# Patient Record
Sex: FEMALE | Race: WHITE | NOT HISPANIC OR LATINO | URBAN - METROPOLITAN AREA
[De-identification: names, ages, dates, MRNs, and addresses within clinical notes are randomized per-mention and may not be internally consistent; named-entity substitution may affect disease eponyms.]

---

## 2018-05-31 ENCOUNTER — IMPORTED ENCOUNTER (OUTPATIENT)
Dept: URBAN - METROPOLITAN AREA CLINIC 9 | Facility: CLINIC | Age: 83
End: 2018-05-31

## 2018-06-07 ENCOUNTER — IMPORTED ENCOUNTER (OUTPATIENT)
Dept: URBAN - METROPOLITAN AREA CLINIC 9 | Facility: CLINIC | Age: 83
End: 2018-06-07

## 2018-06-28 ENCOUNTER — IMPORTED ENCOUNTER (OUTPATIENT)
Dept: URBAN - METROPOLITAN AREA CLINIC 9 | Facility: CLINIC | Age: 83
End: 2018-06-28

## 2018-07-19 ENCOUNTER — IMPORTED ENCOUNTER (OUTPATIENT)
Dept: URBAN - METROPOLITAN AREA CLINIC 9 | Facility: CLINIC | Age: 83
End: 2018-07-19

## 2018-08-23 ENCOUNTER — IMPORTED ENCOUNTER (OUTPATIENT)
Dept: URBAN - METROPOLITAN AREA CLINIC 9 | Facility: CLINIC | Age: 83
End: 2018-08-23

## 2018-10-11 ENCOUNTER — IMPORTED ENCOUNTER (OUTPATIENT)
Dept: URBAN - METROPOLITAN AREA CLINIC 9 | Facility: CLINIC | Age: 83
End: 2018-10-11

## 2018-10-18 ENCOUNTER — IMPORTED ENCOUNTER (OUTPATIENT)
Dept: URBAN - METROPOLITAN AREA CLINIC 9 | Facility: CLINIC | Age: 83
End: 2018-10-18

## 2018-12-20 ENCOUNTER — IMPORTED ENCOUNTER (OUTPATIENT)
Dept: URBAN - METROPOLITAN AREA CLINIC 9 | Facility: CLINIC | Age: 83
End: 2018-12-20

## 2019-02-15 ENCOUNTER — IMPORTED ENCOUNTER (OUTPATIENT)
Dept: URBAN - METROPOLITAN AREA CLINIC 9 | Facility: CLINIC | Age: 84
End: 2019-02-15

## 2019-03-08 ENCOUNTER — IMPORTED ENCOUNTER (OUTPATIENT)
Dept: URBAN - METROPOLITAN AREA CLINIC 9 | Facility: CLINIC | Age: 84
End: 2019-03-08

## 2019-03-19 ENCOUNTER — IMPORTED ENCOUNTER (OUTPATIENT)
Dept: URBAN - METROPOLITAN AREA CLINIC 9 | Facility: CLINIC | Age: 84
End: 2019-03-19

## 2019-04-11 ENCOUNTER — IMPORTED ENCOUNTER (OUTPATIENT)
Dept: URBAN - METROPOLITAN AREA CLINIC 9 | Facility: CLINIC | Age: 84
End: 2019-04-11

## 2019-04-25 ENCOUNTER — IMPORTED ENCOUNTER (OUTPATIENT)
Dept: URBAN - METROPOLITAN AREA CLINIC 9 | Facility: CLINIC | Age: 84
End: 2019-04-25

## 2019-05-02 ENCOUNTER — IMPORTED ENCOUNTER (OUTPATIENT)
Dept: URBAN - METROPOLITAN AREA CLINIC 9 | Facility: CLINIC | Age: 84
End: 2019-05-02

## 2019-05-09 ENCOUNTER — IMPORTED ENCOUNTER (OUTPATIENT)
Dept: URBAN - METROPOLITAN AREA CLINIC 9 | Facility: CLINIC | Age: 84
End: 2019-05-09

## 2019-05-14 ENCOUNTER — IMPORTED ENCOUNTER (OUTPATIENT)
Dept: URBAN - METROPOLITAN AREA CLINIC 9 | Facility: CLINIC | Age: 84
End: 2019-05-14

## 2019-05-16 ENCOUNTER — IMPORTED ENCOUNTER (OUTPATIENT)
Dept: URBAN - METROPOLITAN AREA CLINIC 9 | Facility: CLINIC | Age: 84
End: 2019-05-16

## 2019-05-23 ENCOUNTER — IMPORTED ENCOUNTER (OUTPATIENT)
Dept: URBAN - METROPOLITAN AREA CLINIC 9 | Facility: CLINIC | Age: 84
End: 2019-05-23

## 2019-06-06 ENCOUNTER — IMPORTED ENCOUNTER (OUTPATIENT)
Dept: URBAN - METROPOLITAN AREA CLINIC 9 | Facility: CLINIC | Age: 84
End: 2019-06-06

## 2019-06-20 ENCOUNTER — IMPORTED ENCOUNTER (OUTPATIENT)
Dept: URBAN - METROPOLITAN AREA CLINIC 9 | Facility: CLINIC | Age: 84
End: 2019-06-20

## 2019-06-27 ENCOUNTER — IMPORTED ENCOUNTER (OUTPATIENT)
Dept: URBAN - METROPOLITAN AREA CLINIC 9 | Facility: CLINIC | Age: 84
End: 2019-06-27

## 2019-07-11 ENCOUNTER — IMPORTED ENCOUNTER (OUTPATIENT)
Dept: URBAN - METROPOLITAN AREA CLINIC 9 | Facility: CLINIC | Age: 84
End: 2019-07-11

## 2019-08-08 ENCOUNTER — IMPORTED ENCOUNTER (OUTPATIENT)
Dept: URBAN - METROPOLITAN AREA CLINIC 9 | Facility: CLINIC | Age: 84
End: 2019-08-08

## 2019-08-13 ENCOUNTER — IMPORTED ENCOUNTER (OUTPATIENT)
Dept: URBAN - METROPOLITAN AREA CLINIC 9 | Facility: CLINIC | Age: 84
End: 2019-08-13

## 2019-08-22 ENCOUNTER — IMPORTED ENCOUNTER (OUTPATIENT)
Dept: URBAN - METROPOLITAN AREA CLINIC 9 | Facility: CLINIC | Age: 84
End: 2019-08-22

## 2019-08-29 ENCOUNTER — IMPORTED ENCOUNTER (OUTPATIENT)
Dept: URBAN - METROPOLITAN AREA CLINIC 9 | Facility: CLINIC | Age: 84
End: 2019-08-29

## 2019-09-12 ENCOUNTER — IMPORTED ENCOUNTER (OUTPATIENT)
Dept: URBAN - METROPOLITAN AREA CLINIC 9 | Facility: CLINIC | Age: 84
End: 2019-09-12

## 2019-09-19 ENCOUNTER — IMPORTED ENCOUNTER (OUTPATIENT)
Dept: URBAN - METROPOLITAN AREA CLINIC 9 | Facility: CLINIC | Age: 84
End: 2019-09-19

## 2019-10-03 ENCOUNTER — IMPORTED ENCOUNTER (OUTPATIENT)
Dept: URBAN - METROPOLITAN AREA CLINIC 9 | Facility: CLINIC | Age: 84
End: 2019-10-03

## 2019-10-17 ENCOUNTER — IMPORTED ENCOUNTER (OUTPATIENT)
Dept: URBAN - METROPOLITAN AREA CLINIC 9 | Facility: CLINIC | Age: 84
End: 2019-10-17

## 2019-11-07 ENCOUNTER — IMPORTED ENCOUNTER (OUTPATIENT)
Dept: URBAN - METROPOLITAN AREA CLINIC 9 | Facility: CLINIC | Age: 84
End: 2019-11-07

## 2019-11-21 ENCOUNTER — IMPORTED ENCOUNTER (OUTPATIENT)
Dept: URBAN - METROPOLITAN AREA CLINIC 9 | Facility: CLINIC | Age: 84
End: 2019-11-21

## 2020-01-02 ENCOUNTER — IMPORTED ENCOUNTER (OUTPATIENT)
Dept: URBAN - METROPOLITAN AREA CLINIC 9 | Facility: CLINIC | Age: 85
End: 2020-01-02

## 2020-01-16 ENCOUNTER — IMPORTED ENCOUNTER (OUTPATIENT)
Dept: URBAN - METROPOLITAN AREA CLINIC 9 | Facility: CLINIC | Age: 85
End: 2020-01-16

## 2020-02-13 ENCOUNTER — IMPORTED ENCOUNTER (OUTPATIENT)
Dept: URBAN - METROPOLITAN AREA CLINIC 9 | Facility: CLINIC | Age: 85
End: 2020-02-13

## 2020-03-10 ENCOUNTER — IMPORTED ENCOUNTER (OUTPATIENT)
Dept: URBAN - METROPOLITAN AREA CLINIC 9 | Facility: CLINIC | Age: 85
End: 2020-03-10

## 2020-05-07 ENCOUNTER — IMPORTED ENCOUNTER (OUTPATIENT)
Dept: URBAN - METROPOLITAN AREA CLINIC 9 | Facility: CLINIC | Age: 85
End: 2020-05-07

## 2020-06-18 ENCOUNTER — IMPORTED ENCOUNTER (OUTPATIENT)
Dept: URBAN - METROPOLITAN AREA CLINIC 9 | Facility: CLINIC | Age: 85
End: 2020-06-18

## 2020-09-17 ENCOUNTER — IMPORTED ENCOUNTER (OUTPATIENT)
Dept: URBAN - METROPOLITAN AREA CLINIC 9 | Facility: CLINIC | Age: 85
End: 2020-09-17

## 2020-11-05 ENCOUNTER — IMPORTED ENCOUNTER (OUTPATIENT)
Dept: URBAN - METROPOLITAN AREA CLINIC 9 | Facility: CLINIC | Age: 85
End: 2020-11-05

## 2020-11-19 ENCOUNTER — IMPORTED ENCOUNTER (OUTPATIENT)
Dept: URBAN - METROPOLITAN AREA CLINIC 9 | Facility: CLINIC | Age: 85
End: 2020-11-19

## 2021-02-25 ENCOUNTER — IMPORTED ENCOUNTER (OUTPATIENT)
Dept: URBAN - METROPOLITAN AREA CLINIC 9 | Facility: CLINIC | Age: 86
End: 2021-02-25

## 2021-03-18 ENCOUNTER — IMPORTED ENCOUNTER (OUTPATIENT)
Dept: URBAN - METROPOLITAN AREA CLINIC 9 | Facility: CLINIC | Age: 86
End: 2021-03-18

## 2021-04-08 ENCOUNTER — IMPORTED ENCOUNTER (OUTPATIENT)
Dept: URBAN - METROPOLITAN AREA CLINIC 9 | Facility: CLINIC | Age: 86
End: 2021-04-08

## 2021-05-06 ENCOUNTER — IMPORTED ENCOUNTER (OUTPATIENT)
Dept: URBAN - METROPOLITAN AREA CLINIC 9 | Facility: CLINIC | Age: 86
End: 2021-05-06

## 2021-05-18 ENCOUNTER — IMPORTED ENCOUNTER (OUTPATIENT)
Dept: URBAN - METROPOLITAN AREA CLINIC 9 | Facility: CLINIC | Age: 86
End: 2021-05-18

## 2021-05-27 ENCOUNTER — IMPORTED ENCOUNTER (OUTPATIENT)
Dept: URBAN - METROPOLITAN AREA CLINIC 9 | Facility: CLINIC | Age: 86
End: 2021-05-27

## 2021-07-15 ENCOUNTER — IMPORTED ENCOUNTER (OUTPATIENT)
Dept: URBAN - METROPOLITAN AREA CLINIC 9 | Facility: CLINIC | Age: 86
End: 2021-07-15

## 2021-07-15 PROBLEM — Z96.1: Noted: 2021-07-15

## 2021-10-16 ASSESSMENT — TONOMETRY
OS_IOP_MMHG: 15
OS_IOP_MMHG: 14
OD_IOP_MMHG: 18
OS_IOP_MMHG: 12
OD_IOP_MMHG: 12
OD_IOP_MMHG: 10
OD_IOP_MMHG: 15
OD_IOP_MMHG: 16
OS_IOP_MMHG: 15
OS_IOP_MMHG: 17
OS_IOP_MMHG: 13
OD_IOP_MMHG: 14
OS_IOP_MMHG: 15
OD_IOP_MMHG: 12
OD_IOP_MMHG: 17
OS_IOP_MMHG: 16
OS_IOP_MMHG: 16
OD_IOP_MMHG: 10
OD_IOP_MMHG: 11
OD_IOP_MMHG: 12
OS_IOP_MMHG: 18
OD_IOP_MMHG: 11
OD_IOP_MMHG: 8
OS_IOP_MMHG: 18
OS_IOP_MMHG: 18
OD_IOP_MMHG: 18
OD_IOP_MMHG: 14
OS_IOP_MMHG: 19
OD_IOP_MMHG: 14
OS_IOP_MMHG: 18
OD_IOP_MMHG: 15
OS_IOP_MMHG: 14
OS_IOP_MMHG: 19
OD_IOP_MMHG: 10
OD_IOP_MMHG: 12
OD_IOP_MMHG: 10
OD_IOP_MMHG: 14
OS_IOP_MMHG: 18
OS_IOP_MMHG: 16
OD_IOP_MMHG: 11
OD_IOP_MMHG: 12
OD_IOP_MMHG: 11
OD_IOP_MMHG: 15
OD_IOP_MMHG: 12
OD_IOP_MMHG: 13
OD_IOP_MMHG: 10
OS_IOP_MMHG: 18
OD_IOP_MMHG: 12
OD_IOP_MMHG: 12
OS_IOP_MMHG: 15
OS_IOP_MMHG: 15
OS_IOP_MMHG: 18
OD_IOP_MMHG: 16
OD_IOP_MMHG: 11
OS_IOP_MMHG: 17
OD_IOP_MMHG: 8
OD_IOP_MMHG: 16
OD_IOP_MMHG: 12
OD_IOP_MMHG: 10
OD_IOP_MMHG: 12
OS_IOP_MMHG: 19
OS_IOP_MMHG: 15
OS_IOP_MMHG: 18
OD_IOP_MMHG: 11
OS_IOP_MMHG: 8
OS_IOP_MMHG: 10
OS_IOP_MMHG: 15
OS_IOP_MMHG: 10
OS_IOP_MMHG: 15
OS_IOP_MMHG: 14
OS_IOP_MMHG: 15
OS_IOP_MMHG: 17
OS_IOP_MMHG: 16
OD_IOP_MMHG: 13
OD_IOP_MMHG: 17
OS_IOP_MMHG: 16
OS_IOP_MMHG: 19
OS_IOP_MMHG: 15
OS_IOP_MMHG: 15
OS_IOP_MMHG: 18
OS_IOP_MMHG: 14
OD_IOP_MMHG: 12

## 2021-10-16 ASSESSMENT — VISUAL ACUITY
OD_CC: 20/400 SN
OD_SC: 20/80 SN
OD_CC: 20/400 SN
OS_CC: 20/30 -2 SN
OS_CC: 20/40 - SN
OD_CC: 20/200 SN
OS_SC: 20/60 + SN
OS_PH: 20/40 -2 SN
OS_CC: 20/30 -2 SN
OS_CC: 20/30 -2 SN
OD_CC: 20/40 -2 SN
OS_PH: 20/40 - SN
OD_CC: 20/200 + SN
OD_PH: 20/60 SN
OD_CC: 20/200 SN
OD_PH: 20/50 SN
OD_SC: 20/50 + SN
OS_CC: 20/30 -2 SN
OD_CC: 20/40 SN
OD_CC: 20/40 SN
OS_CC: 20/30 + SN
OS_CC: 20/40 -2 SN
OD_CC: 20/80 -2 SN
OD_CC: 20/200 - SN
OD_CC: 20/200 SN
OD_CC: 20/200 SN
OS_CC: 20/40 SN
OD_PH: 20/70 - SN
OS_CC: 20/40 SN
OS_CC: 20/30 - SN
OD_CC: 20/70 + SN
OD_CC: 20/70 SN
OD_PH: 20/40 SN
OD_CC: 20/30 SN
OS_CC: 20/50 -2 SN
OS_CC: 20/25 -2 SN
OS_CC: 20/30 -2 SN
OS_SC: 20/80 - SN
OD_CC: 20/50 -2 SN
OS_CC: 20/40 + SN
OS_CC: 20/40 SN
OS_CC: 20/40 -2 SN
OD_CC: 20/200 SN
OD_PH: 20/100 - SN
OD_SC: 20/200 SN
OD_SC: 20/100 - SN
OD_CC: 20/50 -2 SN
OD_PH: 20/40 + SN
OS_SC: 20/70 + SN
OD_PH: 20/50 + SN
OS_CC: 20/40 -2 SN
OS_CC: 20/30 SN
OD_CC: 20/200 SN
OS_CC: 20/70 SN
OS_CC: 20/50 -2 SN
OD_CC: 20/200 SN
OS_CC: 20/30 + SN
OD_CC: 20/100 + SN
OS_CC: 20/40 - SN
OD_CC: 20/40 + SN
OD_PH: 20/70 -2 SN
OS_CC: 20/40 -2 SN
OS_CC: 20/50 - SN
OS_CC: 20/30 -2 SN
OS_CC: 20/25 -2 SN
OS_CC: 20/60 + SN
OD_PH: 20/60 + SN
OD_CC: 20/70 SN
OS_SC: 20/80 -2 SN
OS_CC: 20/30 -2 SN
OD_CC: 20/400 SN
OD_CC: 20/400 SN
OS_CC: 20/30 + SN
OD_CC: 20/50 + SN
OD_CC: 20/60 + SN
OD_CC: 20/200 SN
OS_CC: 20/60 - SN
OS_CC: 20/50 + SN
OS_CC: 20/30 + SN
OD_SC: 20/70 + SN
OD_PH: 20/100 + SN
OD_PH: 20/40 + SN
OD_SC: 20/50 + SN
OD_SC: 20/70 - SN
OD_CC: 20/200 - SN
OD_SC: 20/70 + SN
OD_PH: 20/40 SN
OS_PH: 20/50 -2 SN
OD_CC: 20/80 SN
OD_CC: 20/400 SN
OS_SC: CF 2FT SN
OD_CC: 20/40 - SN
OS_CC: 20/30 -2 SN
OD_CC: 20/40 - SN
OS_SC: 20/100 + SN
OD_CC: 20/400 SN
OD_CC: 20/200 SN
OD_SC: 20/200 SN
OS_CC: 20/40 -2 SN
OS_CC: 20/30 -2 SN
OS_CC: 20/60 SN
OD_CC: 20/80 + SN
OD_SC: 20/200 SN
OD_CC: CF 10' LV
OS_CC: 20/40 +2 SN
OD_CC: 20/40 -2 SN
OS_CC: 20/40 SN
OD_SC: 20/50 - SN
OS_CC: 20/30 -2 SN
OD_CC: 20/400 SN
OD_CC: 20/40 - SN
OD_CC: 20/400 SN
OD_SC: 20/200 SN
OD_PH: 20/70 - SN
OS_CC: 20/30 + SN
OD_PH: 20/40 + SN
OS_CC: 20/30 -2 SN
OS_CC: 20/30 -3 SN
OD_CC: 20/30 SN
OD_SC: 20/60 -2 SN
OD_CC: 20/200 SN

## 2021-10-16 ASSESSMENT — KERATOMETRY
OS_K1POWER_DIOPTERS: 44.75
OS_AXISANGLE_DEGREES: 65
OS_K1POWER_DIOPTERS: 44.75
OS_AXISANGLE_DEGREES: 62
OS_K1POWER_DIOPTERS: 45
OD_AXISANGLE_DEGREES: 101
OS_K2POWER_DIOPTERS: 46.5
OS_AXISANGLE2_DEGREES: 152
OS_AXISANGLE_DEGREES: 61
OS_K2POWER_DIOPTERS: 46.25
OD_AXISANGLE2_DEGREES: 11
OS_K1POWER_DIOPTERS: 44.75
OS_AXISANGLE2_DEGREES: 155
OS_AXISANGLE2_DEGREES: 152
OS_AXISANGLE_DEGREES: 49
OS_K2POWER_DIOPTERS: 46.5
OD_K2POWER_DIOPTERS: 46.25
OD_AXISANGLE2_DEGREES: 18
OS_K2POWER_DIOPTERS: 46.5
OD_K2POWER_DIOPTERS: 46.5
OS_AXISANGLE2_DEGREES: 139
OS_K1POWER_DIOPTERS: 44.75
OS_AXISANGLE2_DEGREES: 151
OD_K1POWER_DIOPTERS: 45.25
OS_AXISANGLE_DEGREES: 62
OD_K1POWER_DIOPTERS: 45
OS_K2POWER_DIOPTERS: 46.5
OD_AXISANGLE_DEGREES: 108

## 2021-11-04 ENCOUNTER — MOHS SURGERY-NO REPAIR (OUTPATIENT)
Dept: URBAN - METROPOLITAN AREA CLINIC 12 | Facility: CLINIC | Age: 86
Setting detail: DERMATOLOGY
End: 2021-11-04

## 2021-11-04 DIAGNOSIS — L82.1 OTHER SEBORRHEIC KERATOSIS: ICD-10-CM

## 2021-11-04 PROCEDURE — 17311 MOHS 1 STAGE H/N/HF/G: CPT

## 2021-11-16 ENCOUNTER — POST-OP (OUTPATIENT)
Dept: URBAN - METROPOLITAN AREA CLINIC 9 | Facility: CLINIC | Age: 86
End: 2021-11-16

## 2021-11-16 DIAGNOSIS — Z98.890: ICD-10-CM

## 2021-11-16 PROCEDURE — 99024 POSTOP FOLLOW-UP VISIT: CPT

## 2021-11-16 ASSESSMENT — VISUAL ACUITY
OS_PH: 20/50-2
OD_PH: 20/70-2

## 2021-11-18 ENCOUNTER — ESTABLISHED PATIENT (OUTPATIENT)
Dept: URBAN - METROPOLITAN AREA CLINIC 9 | Facility: CLINIC | Age: 86
End: 2021-11-18

## 2021-11-18 DIAGNOSIS — C44.1191: ICD-10-CM

## 2021-11-18 DIAGNOSIS — Z96.1: ICD-10-CM

## 2021-11-18 DIAGNOSIS — H25.11: ICD-10-CM

## 2021-11-18 DIAGNOSIS — B00.52: ICD-10-CM

## 2021-11-18 PROCEDURE — 99214 OFFICE O/P EST MOD 30 MIN: CPT

## 2021-11-18 RX ORDER — BUSPIRONE HYDROCHLORIDE 10 MG/1
1/2 TABLET ORAL
Start: 2021-11-18

## 2021-11-18 RX ORDER — LOTEPREDNOL ETABONATE 2 MG/ML
1 SUSPENSION/ DROPS OPHTHALMIC ONCE A DAY
Start: 2021-11-18

## 2021-11-18 ASSESSMENT — VISUAL ACUITY
OS_CC: J3
OS_CC: 20/50
OD_CC: J3
OD_CC: 20/70

## 2021-11-18 ASSESSMENT — TONOMETRY
OD_IOP_MMHG: 12
OS_IOP_MMHG: 13

## 2022-02-10 ENCOUNTER — FOLLOW UP (OUTPATIENT)
Dept: URBAN - METROPOLITAN AREA CLINIC 9 | Facility: CLINIC | Age: 87
End: 2022-02-10

## 2022-02-10 DIAGNOSIS — Z96.1: ICD-10-CM

## 2022-02-10 DIAGNOSIS — B00.52: ICD-10-CM

## 2022-02-10 DIAGNOSIS — C44.1191: ICD-10-CM

## 2022-02-10 PROCEDURE — 92014 COMPRE OPH EXAM EST PT 1/>: CPT

## 2022-02-10 ASSESSMENT — VISUAL ACUITY
OD_PH: 20/60+1
OS_CC: 20/25-2
OD_CC: 20/70-1
OU_CC: 20/30-1
OU_CC: J2+2

## 2022-02-10 ASSESSMENT — TONOMETRY
OS_IOP_MMHG: 16
OD_IOP_MMHG: 17

## 2022-04-28 ENCOUNTER — ESTABLISHED PATIENT (OUTPATIENT)
Dept: URBAN - METROPOLITAN AREA CLINIC 9 | Facility: CLINIC | Age: 87
End: 2022-04-28

## 2022-04-28 DIAGNOSIS — B00.52: ICD-10-CM

## 2022-04-28 DIAGNOSIS — H35.3232: ICD-10-CM

## 2022-04-28 DIAGNOSIS — H16.211: ICD-10-CM

## 2022-04-28 DIAGNOSIS — Z96.1: ICD-10-CM

## 2022-04-28 DIAGNOSIS — H17.9: ICD-10-CM

## 2022-04-28 DIAGNOSIS — H16.231: ICD-10-CM

## 2022-04-28 PROCEDURE — 92014 COMPRE OPH EXAM EST PT 1/>: CPT

## 2022-04-28 ASSESSMENT — VISUAL ACUITY
OS_SC: J5-2
OD_CC: 20/400
OD_PH: 20/100
OD_SC: J16
OS_CC: 20/30+2
OU_CC: J1+

## 2022-04-28 ASSESSMENT — TONOMETRY
OD_IOP_MMHG: 14
OS_IOP_MMHG: 11

## 2022-06-17 RX ORDER — ESTRADIOL 0.5 MG/1
TABLET ORAL
COMMUNITY

## 2022-06-17 RX ORDER — LOTEPREDNOL ETABONATE 5 MG/G
GEL OPHTHALMIC
COMMUNITY

## 2022-06-17 RX ORDER — VALACYCLOVIR HYDROCHLORIDE 500 MG/1
TABLET, FILM COATED ORAL
COMMUNITY
End: 2022-08-11 | Stop reason: ALTCHOICE

## 2022-07-30 PROBLEM — M54.50 ACUTE BILATERAL LOW BACK PAIN WITHOUT SCIATICA: Status: ACTIVE | Noted: 2022-07-30

## 2022-07-30 PROBLEM — F02.80 DEMENTIA IN OTHER DISEASES CLASSIFIED ELSEWHERE WITHOUT BEHAVIORAL DISTURBANCE: Status: ACTIVE | Noted: 2022-07-30

## 2022-07-30 PROBLEM — F32.9 MAJOR DEPRESSIVE DISORDER, SINGLE EPISODE, UNSPECIFIED: Status: ACTIVE | Noted: 2022-07-30

## 2022-07-30 PROBLEM — C34.11 MALIGNANT NEOPLASM OF UPPER LOBE OF RIGHT LUNG (HCC): Status: ACTIVE | Noted: 2022-07-30

## 2022-07-30 PROBLEM — M54.41 LUMBAGO WITH SCIATICA, RIGHT SIDE: Status: ACTIVE | Noted: 2022-07-30

## 2022-07-30 PROBLEM — K22.70 BARRETT'S ESOPHAGUS WITHOUT DYSPLASIA: Status: ACTIVE | Noted: 2022-07-30

## 2022-07-30 PROBLEM — J44.9 COPD (CHRONIC OBSTRUCTIVE PULMONARY DISEASE) (HCC): Status: ACTIVE | Noted: 2022-07-30

## 2022-07-30 PROBLEM — C34.12 CANCER OF BRONCHUS OF LEFT UPPER LOBE (HCC): Status: ACTIVE | Noted: 2022-07-30

## 2022-07-30 PROBLEM — M72.9 MUSCULOSKELETAL FIBROMATOSIS: Status: ACTIVE | Noted: 2022-07-30

## 2022-07-30 PROBLEM — G47.10 HYPERSOMNOLENCE: Status: ACTIVE | Noted: 2022-07-30

## 2022-07-30 PROBLEM — R29.898 RIGHT LEG WEAKNESS: Status: ACTIVE | Noted: 2022-07-30

## 2022-07-30 PROBLEM — G89.29 OTHER CHRONIC PAIN: Status: ACTIVE | Noted: 2022-07-30

## 2022-07-30 PROBLEM — C34.31 CANCER OF BRONCHUS OF RIGHT LOWER LOBE (HCC): Status: ACTIVE | Noted: 2022-07-30

## 2022-07-30 PROBLEM — F43.21 GRIEF REACTION: Status: ACTIVE | Noted: 2022-07-30

## 2022-07-30 PROBLEM — C34.30 MALIGNANT NEOPLASM OF LOWER LOBE, UNSPECIFIED BRONCHUS OR LUNG (HCC): Status: ACTIVE | Noted: 2022-07-30

## 2022-07-30 PROBLEM — M54.30 SCIATICA: Status: ACTIVE | Noted: 2022-07-30

## 2022-07-30 PROBLEM — M18.12 LOCALIZED PRIMARY OSTEOARTHRITIS OF CARPOMETACARPAL JOINT OF LEFT THUMB: Status: ACTIVE | Noted: 2022-07-30

## 2022-07-30 PROBLEM — E78.5 HYPERLIPIDEMIA: Status: ACTIVE | Noted: 2022-07-30

## 2022-07-30 PROBLEM — F02.80 ALZHEIMER'S DISEASE WITH LATE ONSET (HCC): Status: ACTIVE | Noted: 2022-07-30

## 2022-07-30 PROBLEM — I10 HYPERTENSION: Status: ACTIVE | Noted: 2022-07-30

## 2022-07-30 PROBLEM — G30.1 ALZHEIMER'S DISEASE WITH LATE ONSET (HCC): Status: ACTIVE | Noted: 2022-07-30

## 2022-07-30 PROBLEM — H93.19 TINNITUS: Status: ACTIVE | Noted: 2022-07-30

## 2022-07-30 PROBLEM — M54.16 LUMBAR RADICULOPATHY: Status: ACTIVE | Noted: 2022-07-30

## 2022-07-30 PROBLEM — R41.3 MEMORY LOSS: Status: ACTIVE | Noted: 2022-07-30

## 2022-07-30 PROBLEM — M47.816 LUMBAR FACET ARTHROPATHY: Status: ACTIVE | Noted: 2022-07-30

## 2022-07-30 PROBLEM — M72.0 DUPUYTREN CONTRACTURE: Status: ACTIVE | Noted: 2022-07-30

## 2022-07-30 PROBLEM — R91.1 LUNG NODULE: Status: ACTIVE | Noted: 2022-07-30

## 2022-07-30 PROBLEM — I49.9 IRREGULAR HEART RHYTHM: Status: ACTIVE | Noted: 2022-07-30

## 2022-08-11 PROBLEM — F02.80 DEMENTIA IN OTHER DISEASES CLASSIFIED ELSEWHERE WITHOUT BEHAVIORAL DISTURBANCE: Status: RESOLVED | Noted: 2022-07-30 | Resolved: 2022-08-11

## 2022-08-11 PROBLEM — F43.21 GRIEF REACTION: Status: RESOLVED | Noted: 2022-07-30 | Resolved: 2022-08-11

## 2024-01-04 ENCOUNTER — OFFICE VISIT (OUTPATIENT)
Dept: PRIMARY CARE | Facility: CLINIC | Age: 89
End: 2024-01-04
Payer: MEDICARE

## 2024-01-04 VITALS
SYSTOLIC BLOOD PRESSURE: 126 MMHG | DIASTOLIC BLOOD PRESSURE: 76 MMHG | OXYGEN SATURATION: 98 % | HEIGHT: 63 IN | WEIGHT: 103 LBS | HEART RATE: 77 BPM | BODY MASS INDEX: 18.25 KG/M2

## 2024-01-04 DIAGNOSIS — K22.70 BARRETT'S ESOPHAGUS WITHOUT DYSPLASIA: ICD-10-CM

## 2024-01-04 DIAGNOSIS — I10 PRIMARY HYPERTENSION: ICD-10-CM

## 2024-01-04 DIAGNOSIS — R91.1 LUNG NODULE: ICD-10-CM

## 2024-01-04 DIAGNOSIS — R07.89 XIPHOID PAIN: ICD-10-CM

## 2024-01-04 DIAGNOSIS — C34.12: ICD-10-CM

## 2024-01-04 DIAGNOSIS — J20.8 ACUTE BRONCHITIS DUE TO OTHER SPECIFIED ORGANISMS: ICD-10-CM

## 2024-01-04 DIAGNOSIS — G30.9 ALZHEIMER'S DISEASE (MULTI): Primary | ICD-10-CM

## 2024-01-04 DIAGNOSIS — F02.80 ALZHEIMER'S DISEASE (MULTI): Primary | ICD-10-CM

## 2024-01-04 DIAGNOSIS — K59.09 INTERMITTENT CONSTIPATION: ICD-10-CM

## 2024-01-04 PROBLEM — G31.01 PRIMARY PROGRESSIVE APHASIA (MULTI): Status: ACTIVE | Noted: 2023-02-23

## 2024-01-04 PROBLEM — Z85.828 HISTORY OF SKIN CANCER: Status: ACTIVE | Noted: 2022-08-19

## 2024-01-04 PROCEDURE — 1036F TOBACCO NON-USER: CPT | Performed by: INTERNAL MEDICINE

## 2024-01-04 PROCEDURE — 1159F MED LIST DOCD IN RCRD: CPT | Performed by: INTERNAL MEDICINE

## 2024-01-04 PROCEDURE — 3078F DIAST BP <80 MM HG: CPT | Performed by: INTERNAL MEDICINE

## 2024-01-04 PROCEDURE — 3074F SYST BP LT 130 MM HG: CPT | Performed by: INTERNAL MEDICINE

## 2024-01-04 PROCEDURE — 99214 OFFICE O/P EST MOD 30 MIN: CPT | Performed by: INTERNAL MEDICINE

## 2024-01-04 RX ORDER — MEMANTINE HYDROCHLORIDE 10 MG/1
10 TABLET ORAL 2 TIMES DAILY
COMMUNITY

## 2024-01-04 RX ORDER — CALCIUM CARBONATE/VITAMIN D3 500-10/5ML
1 LIQUID (ML) ORAL
COMMUNITY

## 2024-01-04 RX ORDER — ERYTHROMYCIN 5 MG/G
OINTMENT OPHTHALMIC
COMMUNITY

## 2024-01-04 RX ORDER — DOXYCYCLINE 100 MG/1
100 CAPSULE ORAL 2 TIMES DAILY
Qty: 20 CAPSULE | Refills: 0 | Status: SHIPPED | OUTPATIENT
Start: 2024-01-04 | End: 2024-01-14

## 2024-01-04 RX ORDER — LANOLIN ALCOHOL/MO/W.PET/CERES
1000 CREAM (GRAM) TOPICAL
COMMUNITY
Start: 2023-11-01

## 2024-01-04 RX ORDER — MOMETASONE FUROATE 1 MG/G
CREAM TOPICAL
COMMUNITY
Start: 2023-07-13

## 2024-01-04 ASSESSMENT — ENCOUNTER SYMPTOMS
OCCASIONAL FEELINGS OF UNSTEADINESS: 0
LOSS OF SENSATION IN FEET: 0
DEPRESSION: 0

## 2024-01-04 NOTE — PATIENT INSTRUCTIONS
Acute bronchitis  Doxycycline 100 mg twice a day for 10 days  Mucinex-DM twice a day as needed for cough  NyQuil at bedtime as needed    Alzheimer's disease  Expressive aphasia  Continue memantine 10 mg daily  Upcoming appointment scheduled with [facility] neurolog[facility], Dr. Dr. Curran next week to review recent MRI results and additional treatment options    Essential hypertension/labile blood pressure  (Normal home blood pressure readings/office reading today)  Continue to monitor blood pressure expectantly    Xiphoid process pain (lower end of breastbone)  Monitor expectantly  For pain, Tylenol 500 mg, 1 or 2 tablets twice a day as needed can be used    Intermittent constipation  Continue Metamucil  Maintain daily intake of fruits, vegetables, fiber and adequate fluid intake    Lung cancer, left lung (stage IIb)  (Stable on serial CT chest imaging per Dr. Pizarro, outside hospital)  Establish care with  pulmonary medicine    Health maintenance  RSV vaccine recommended.    Follow-up  Wellness exam in April/May  (Fasting lab work be ordered to complete 3 to 7 days prior to visit)

## 2024-01-04 NOTE — PROGRESS NOTES
Subjective   Patient ID: Alexandria Blankenship is a 88 y.o. female who presents for Aerodigestive Follow Up Visit.    Follow-up visit  (Established patient from [facility], accompanied by her grandson Rogelio Dumont)    Recent cough/upper respiratory infection  Approximately 2-week history of cough with chest congestion, productive of yellow sputum.  Patient has been using over-the-counter DayQuil and NyQuil with temporary relief.  No fever, chills, shortness of breath, wheezing.    Alzheimer's disease  Expressive aphasia  Patient is currently on memantine 10 mg daily  MRI brain completed this week and follow-up scheduled with [facility] neurology, Dr. Dr. Curran next week to review recent MRI results and additional treatment options    Essential hypertension/labile blood pressure  Patient states home blood pressure readings have been in the 120-130/70 range.  Blood pressure in office today is normal.  Patient previously on antihypertensive therapy, not for at least 1 year.    Xiphoid process pain   Patient notes pain with touching area.  No trauma.  Does not interfere with activities.  Patient cannot state duration of onset.    Intermittent constipation  Patient states having regular bowel movements, remains on Metamucil    Lung cancer, left lung (stage IIb)  Previous/current treatment per Dr. Pizarro in [place]  Most recent CT chest this past July stable.  Next CT chest/follow-up with Dr. Pizarro scheduled in July 2024  Patient/family interested in establishing care with  pulmonary medicine.    Health maintenance  RSV vaccine recommended.           Review of Systems   Constitutional:  Negative for chills, fatigue and fever.   HENT:  Negative for postnasal drip and rhinorrhea.    Respiratory:  Positive for cough. Negative for shortness of breath.    Cardiovascular:  Negative for palpitations.   Gastrointestinal:  Negative for abdominal pain, constipation and diarrhea.   Neurological:  Negative for dizziness and headaches.  "      Objective   /76 (BP Location: Left arm, Patient Position: Sitting, BP Cuff Size: Adult)   Pulse 77   Ht 1.6 m (5' 3\")   Wt 46.7 kg (103 lb)   SpO2 98%   BMI 18.25 kg/m²     Physical Exam  Vitals reviewed.   HENT:      Head: Normocephalic.      Right Ear: Tympanic membrane normal.      Left Ear: Tympanic membrane normal.      Mouth/Throat:      Mouth: Mucous membranes are moist.   Eyes:      Conjunctiva/sclera: Conjunctivae normal.   Cardiovascular:      Rate and Rhythm: Normal rate and regular rhythm.   Pulmonary:      Effort: Pulmonary effort is normal.      Breath sounds: Normal breath sounds. No wheezing.   Abdominal:      General: Abdomen is flat. Bowel sounds are normal.      Palpations: Abdomen is soft.      Tenderness: There is no abdominal tenderness.   Musculoskeletal:      Right lower leg: No edema.      Left lower leg: No edema.      Comments: Tenderness at the xiphoid process without deformity, ecchymosis, erythema   Neurological:      General: No focal deficit present.      Mental Status: She is alert.   Psychiatric:         Mood and Affect: Mood normal.         Assessment/Plan     Acute bronchitis  Doxycycline 100 mg twice a day for 10 days  Mucinex-DM twice a day as needed for cough  NyQuil at bedtime as needed    Alzheimer's disease  Expressive aphasia  Continue memantine 10 mg daily  Upcoming appointment scheduled with [facility] neurology, Dr. Dr. Curran next week to review recent MRI results and additional treatment options    Essential hypertension/labile blood pressure  (Normal home blood pressure readings/office reading today)  Continue to monitor blood pressure expectantly    Xiphoid process pain (lower end of breastbone)  Monitor expectantly  For pain, Tylenol 500 mg, 1 or 2 tablets twice a day as needed can be used    Intermittent constipation  Continue Metamucil  Maintain daily intake of fruits, vegetables, fiber and adequate fluid intake    Lung cancer, left lung (stage IIb), " 5/2019  (Stable on serial CT chest imaging per Dr. Pizarro, outside hospital)  Establish care with  pulmonary medicine    Health maintenance  RSV vaccine recommended.    Follow-up  Wellness exam in April/May  (Fasting lab work be ordered to complete 3 to 7 days prior to visit)    Isacc Roldan MD

## 2024-01-07 ENCOUNTER — TELEPHONE (OUTPATIENT)
Dept: PRIMARY CARE | Facility: CLINIC | Age: 89
End: 2024-01-07
Payer: MEDICARE

## 2024-01-07 DIAGNOSIS — Z00.00 WELLNESS EXAMINATION: Primary | ICD-10-CM

## 2024-01-07 PROBLEM — M47.816 DEGENERATIVE ARTHRITIS OF LUMBAR SPINE: Status: ACTIVE | Noted: 2024-01-07

## 2024-01-07 ASSESSMENT — ENCOUNTER SYMPTOMS
DIZZINESS: 0
FATIGUE: 0
SHORTNESS OF BREATH: 0
CHILLS: 0
PALPITATIONS: 0
ABDOMINAL PAIN: 0
COUGH: 1
HEADACHES: 0
CONSTIPATION: 0
FEVER: 0
DIARRHEA: 0
RHINORRHEA: 0

## 2024-01-21 PROBLEM — M81.0 AGE-RELATED OSTEOPOROSIS WITHOUT CURRENT PATHOLOGICAL FRACTURE: Status: ACTIVE | Noted: 2024-01-21

## 2024-02-14 ENCOUNTER — PATIENT MESSAGE (OUTPATIENT)
Dept: PRIMARY CARE | Facility: CLINIC | Age: 89
End: 2024-02-14
Payer: MEDICARE

## 2024-02-15 NOTE — TELEPHONE ENCOUNTER
From: Aleaxndria Blankenship  To: Isacc Roldan MD  Sent: 2/14/2024 9:57 PM EST  Subject: Hips    Hey hope the family is doing well!     Would you like us to make an appointment to come in and talk about the osteoporosis?    I would like to get her on the medication. Also, should I start her on vitamin D and calcium supplements?    Thanks   Richie

## 2024-02-20 ENCOUNTER — OFFICE VISIT (OUTPATIENT)
Dept: PRIMARY CARE | Facility: CLINIC | Age: 89
End: 2024-02-20
Payer: MEDICARE

## 2024-02-20 VITALS
SYSTOLIC BLOOD PRESSURE: 140 MMHG | BODY MASS INDEX: 18.6 KG/M2 | DIASTOLIC BLOOD PRESSURE: 82 MMHG | WEIGHT: 105 LBS | OXYGEN SATURATION: 97 % | HEART RATE: 63 BPM

## 2024-02-20 DIAGNOSIS — C34.12: ICD-10-CM

## 2024-02-20 DIAGNOSIS — G30.9 ALZHEIMER'S DISEASE (MULTI): ICD-10-CM

## 2024-02-20 DIAGNOSIS — Z85.828 HISTORY OF SKIN CANCER: ICD-10-CM

## 2024-02-20 DIAGNOSIS — K22.70 BARRETT'S ESOPHAGUS WITHOUT DYSPLASIA: ICD-10-CM

## 2024-02-20 DIAGNOSIS — F02.80 ALZHEIMER'S DISEASE (MULTI): ICD-10-CM

## 2024-02-20 DIAGNOSIS — M81.0 AGE-RELATED OSTEOPOROSIS WITHOUT CURRENT PATHOLOGICAL FRACTURE: ICD-10-CM

## 2024-02-20 DIAGNOSIS — I10 PRIMARY HYPERTENSION: Primary | ICD-10-CM

## 2024-02-20 DIAGNOSIS — L98.9 SKIN LESIONS, GENERALIZED: ICD-10-CM

## 2024-02-20 PROCEDURE — 99214 OFFICE O/P EST MOD 30 MIN: CPT | Performed by: INTERNAL MEDICINE

## 2024-02-20 PROCEDURE — 3077F SYST BP >= 140 MM HG: CPT | Performed by: INTERNAL MEDICINE

## 2024-02-20 PROCEDURE — 3079F DIAST BP 80-89 MM HG: CPT | Performed by: INTERNAL MEDICINE

## 2024-02-20 PROCEDURE — 1036F TOBACCO NON-USER: CPT | Performed by: INTERNAL MEDICINE

## 2024-02-20 PROCEDURE — 1159F MED LIST DOCD IN RCRD: CPT | Performed by: INTERNAL MEDICINE

## 2024-02-20 RX ORDER — LOSARTAN POTASSIUM 25 MG/1
12.5 TABLET ORAL DAILY
Qty: 30 TABLET | Refills: 5 | Status: SHIPPED | OUTPATIENT
Start: 2024-02-20 | End: 2025-02-19

## 2024-02-20 NOTE — PROGRESS NOTES
Subjective   Patient ID: Taylor Blankenship is a 88 y.o. female who presents for Osteoporosis.    Routine follow-up  (Patient accompanied by her grandson, Richie Dumont)    Osteoporosis   No prior treatment for osteoporosis.  Current bone densitometry as well as rationale for treatment discussed.  Patient has history of Patel's esophagus, but denies any current heartburn symptoms.  Options for treatment including alendronate and Prolia discussed.  Patient/grandson would like to pursue Prolia treatment.    Primary hypertension  Patient has previously been on antihypertensive medication, none in the last 1 to 2 years.  Blood pressure elevated for which low-dose treatment with medication recommended    Skin lesions (generalized)  History of skin cancer  Referral to dermatology for full body skin exam provided    Lung cancer, left lung (stage IIb), 5/2019  (Stable on serial CT chest imaging per Dr. Pizarro, outside hospital)  Establish care with  pulmonary medicine recommended     Patel's esophagus (per chart history)  No current symptoms of heartburn or dysphagia.  Previous records from [place] provider including endoscopies to be pursued by daughter    Alzheimer's type dementia  [facility] neurology, , in January.  Patient candidate for treatment with lequembi though patient declined.  Memantine dose increased from 10 mg daily to twice a day.  Patient to follow-up in 2 to 3 months, April, for update, consideration to and donepezil     Health maintenance  Tdap vaccine and RSV vaccine recommended (to receive at pharmacy)         Review of Systems   Respiratory:  Negative for cough and shortness of breath.    Gastrointestinal:  Negative for constipation and diarrhea.   Musculoskeletal:  Negative for arthralgias.   Skin:  Negative for rash.   Neurological:  Negative for dizziness and headaches.       Objective   /82 (BP Location: Left arm, Patient Position: Sitting, BP Cuff Size: Adult)   Pulse 63   Wt  47.6 kg (105 lb)   LMP  (LMP Unknown)   SpO2 97%   BMI 18.60 kg/m²     Physical Exam  Vitals reviewed.   HENT:      Head: Normocephalic.      Mouth/Throat:      Mouth: Mucous membranes are moist.   Cardiovascular:      Rate and Rhythm: Normal rate and regular rhythm.   Pulmonary:      Effort: Pulmonary effort is normal.      Breath sounds: Normal breath sounds.   Abdominal:      General: Bowel sounds are normal.      Tenderness: There is no abdominal tenderness.   Musculoskeletal:      Right lower leg: No edema.      Left lower leg: No edema.   Skin:     Comments: Numerous scaly skin lesions on torso and extremities.   Neurological:      General: No focal deficit present.      Mental Status: She is alert.   Psychiatric:         Mood and Affect: Mood normal.         Assessment/Plan     Osteoporosis (without prior treatment)  Continued active lifestyle with walking and attending exercise classes recommended  Adequate intake of calcium and vitamin D recommended  Vitamin D and CMP lab work ordered to complete in 10 to 14 days  Treatment options discussed and patient interested in pursuing Prolia injections every 6 months (referral to be made to infusion center)    Primary hypertension  Start losartan 25 mg tablet, 1/2 tablet daily  Monitor home blood pressure readings twice a week and send results in 10-14 days  CMP lab work in 10-14 days    Skin lesions (generalized)  History of skin cancer  Referral to dermatology for full body skin exam    Lung cancer, left lung (stage IIb), 5/2019  (Stable on serial CT chest imaging per Dr. Pizarro, outside hospital)  Establish care with  pulmonary medicine     Patel's esophagus (per chart history)  Recommend gastroenterology consult    Alzheimer's type dementia  Continue memantine 10 mg twice a day  Contact [facility], , in April with update on symptoms, next office visit scheduled in June  Keep mind active with reading, word puzzles, socializing.  Maintain good  nutrition, adequate sleep    Health maintenance  Tdap vaccine and RSV vaccine recommended (to receive at pharmacy)    Follow-up  Wellness exam/physical in May (as scheduled)  (Additional lab work will be ordered to complete 3 to 5 days prior to May appointment)    Isacc Roldan MD

## 2024-02-20 NOTE — PATIENT INSTRUCTIONS
Osteoporosis (without prior treatment)  Continued active lifestyle with walking and attending exercise classes recommended  Adequate intake of calcium and vitamin D recommended  Vitamin D and CMP lab work ordered to complete in 10 to 14 days  Treatment options discussed and patient interested in pursuing Prolia injections every 6 months (referral to be made to infusion center)    Primary hypertension  Start losartan 25 mg tablet, 1/2 tablet daily  Monitor home blood pressure readings twice a week and send results in 10-14 days  CMP lab work in 10-14 days    Skin lesions (generalized)  History of skin cancer  Referral to dermatology for full body skin exam    Lung cancer, left lung (stage IIb), 5/2019  (Stable on serial CT chest imaging per Dr. Pizarro, outside hospital)  Establish care with  pulmonary medicine       Patel's esophagus (per chart history)  Recommend gastroenterology consult    Alzheimer's type dementia  Continue memantine 10 mg twice a day  Contact [facility] neurology, , in April with update on symptoms, next office visit scheduled in June  Keep mind active with reading, word puzzles, socializing.  Maintain good nutrition, adequate sleep    Health maintenance  Tdap vaccine and RSV vaccine recommended (to receive at pharmacy)    Follow-up  Wellness exam/physical in May (as scheduled)  (Additional lab work will be ordered to complete 3 to 5 days prior to May appointment)

## 2024-02-21 ASSESSMENT — ENCOUNTER SYMPTOMS
COUGH: 0
HEADACHES: 0
DIZZINESS: 0
DIARRHEA: 0
SHORTNESS OF BREATH: 0
ARTHRALGIAS: 0
CONSTIPATION: 0

## 2024-03-06 ENCOUNTER — LAB (OUTPATIENT)
Dept: LAB | Facility: LAB | Age: 89
End: 2024-03-06
Payer: MEDICARE

## 2024-03-06 DIAGNOSIS — I10 PRIMARY HYPERTENSION: ICD-10-CM

## 2024-03-06 DIAGNOSIS — M81.0 AGE-RELATED OSTEOPOROSIS WITHOUT CURRENT PATHOLOGICAL FRACTURE: ICD-10-CM

## 2024-03-06 LAB
25(OH)D3 SERPL-MCNC: 37 NG/ML (ref 30–100)
ALBUMIN SERPL BCP-MCNC: 4.2 G/DL (ref 3.4–5)
ALP SERPL-CCNC: 85 U/L (ref 33–136)
ALT SERPL W P-5'-P-CCNC: 12 U/L (ref 7–45)
ANION GAP SERPL CALC-SCNC: 12 MMOL/L (ref 10–20)
AST SERPL W P-5'-P-CCNC: 19 U/L (ref 9–39)
BILIRUB SERPL-MCNC: 0.5 MG/DL (ref 0–1.2)
BUN SERPL-MCNC: 23 MG/DL (ref 6–23)
CALCIUM SERPL-MCNC: 10.1 MG/DL (ref 8.6–10.6)
CHLORIDE SERPL-SCNC: 105 MMOL/L (ref 98–107)
CO2 SERPL-SCNC: 29 MMOL/L (ref 21–32)
CREAT SERPL-MCNC: 0.89 MG/DL (ref 0.5–1.05)
EGFRCR SERPLBLD CKD-EPI 2021: 63 ML/MIN/1.73M*2
GLUCOSE SERPL-MCNC: 87 MG/DL (ref 74–99)
POTASSIUM SERPL-SCNC: 4.2 MMOL/L (ref 3.5–5.3)
PROT SERPL-MCNC: 7 G/DL (ref 6.4–8.2)
SODIUM SERPL-SCNC: 142 MMOL/L (ref 136–145)

## 2024-03-06 PROCEDURE — 80053 COMPREHEN METABOLIC PANEL: CPT

## 2024-03-06 PROCEDURE — 82306 VITAMIN D 25 HYDROXY: CPT

## 2024-03-06 PROCEDURE — 36415 COLL VENOUS BLD VENIPUNCTURE: CPT

## 2024-03-11 ENCOUNTER — TELEPHONE (OUTPATIENT)
Dept: PRIMARY CARE | Facility: CLINIC | Age: 89
End: 2024-03-11
Payer: MEDICARE

## 2024-03-11 NOTE — TELEPHONE ENCOUNTER
Richie ([phone]) said Taylor is having left flank pain.  I called Taylor ([phone]) to get more information.  She complains of neck, back, left flank pain.  It's been bothering her for 1 week now.  Please advise.  Thanks

## 2024-03-12 NOTE — TELEPHONE ENCOUNTER
I spoke with Richie and he's requesting 3-13 (1-4pm) or 3-14 (anytime).  Please let me know what time would work for you. He will arrange transportation because he won't be able to bring her this week.  Thanks

## 2024-03-14 ENCOUNTER — OFFICE VISIT (OUTPATIENT)
Dept: PRIMARY CARE | Facility: CLINIC | Age: 89
End: 2024-03-14
Payer: MEDICARE

## 2024-03-14 VITALS
DIASTOLIC BLOOD PRESSURE: 80 MMHG | SYSTOLIC BLOOD PRESSURE: 140 MMHG | BODY MASS INDEX: 18.78 KG/M2 | WEIGHT: 106 LBS | OXYGEN SATURATION: 98 % | HEART RATE: 80 BPM

## 2024-03-14 DIAGNOSIS — M81.0 AGE-RELATED OSTEOPOROSIS WITHOUT CURRENT PATHOLOGICAL FRACTURE: ICD-10-CM

## 2024-03-14 DIAGNOSIS — C34.12: ICD-10-CM

## 2024-03-14 DIAGNOSIS — R10.84 GENERALIZED ABDOMINAL PAIN: ICD-10-CM

## 2024-03-14 DIAGNOSIS — M54.6 ACUTE LEFT-SIDED THORACIC BACK PAIN: ICD-10-CM

## 2024-03-14 DIAGNOSIS — Z12.31 ENCOUNTER FOR SCREENING MAMMOGRAM FOR MALIGNANT NEOPLASM OF BREAST: ICD-10-CM

## 2024-03-14 DIAGNOSIS — I10 PRIMARY HYPERTENSION: Primary | ICD-10-CM

## 2024-03-14 PROCEDURE — 99214 OFFICE O/P EST MOD 30 MIN: CPT | Performed by: INTERNAL MEDICINE

## 2024-03-14 PROCEDURE — 1036F TOBACCO NON-USER: CPT | Performed by: INTERNAL MEDICINE

## 2024-03-14 PROCEDURE — 3079F DIAST BP 80-89 MM HG: CPT | Performed by: INTERNAL MEDICINE

## 2024-03-14 PROCEDURE — 3077F SYST BP >= 140 MM HG: CPT | Performed by: INTERNAL MEDICINE

## 2024-03-14 PROCEDURE — 1125F AMNT PAIN NOTED PAIN PRSNT: CPT | Performed by: INTERNAL MEDICINE

## 2024-03-14 PROCEDURE — 1159F MED LIST DOCD IN RCRD: CPT | Performed by: INTERNAL MEDICINE

## 2024-03-14 ASSESSMENT — ENCOUNTER SYMPTOMS
ARTHRALGIAS: 0
NAUSEA: 0
DYSURIA: 0
BACK PAIN: 1
SHORTNESS OF BREATH: 0
PAIN: 1
COUGH: 0
CONSTIPATION: 0
ABDOMINAL PAIN: 1
HEADACHES: 0
VOMITING: 0
PALPITATIONS: 0

## 2024-03-14 ASSESSMENT — PAIN SCALES - GENERAL: PAINLEVEL: 8

## 2024-03-14 NOTE — PATIENT INSTRUCTIONS
Left anterior/posterior chest pain (chronic, though with increased symptoms)  History of lung cancer (most recent CT chest 7/2023 in [place] stable)  CT chest without contrast ordered  New patient pulmonary consultation scheduled on 3/26    Generalized (epigastric, suprapubic, left lower abdomen) abdominal pain  History of lung cancer  CT abdomen/pelvis with contrast  New patient gastroenterology consultation scheduled on 6/3    Patel's esophagus (per chart history)  New patient gastroenterology consultation scheduled on 6/3  Request for records to be sent to prior gastroenterologist, Dr. Vega    Acute left-sided thoracic back pain, possible strain/evaluate for thoracic arthritis or lumbar vertebral fracture  X-ray thoracic spine    Osteoporosis  Plan to initiate Prolia treatment at [facility] center (order to be placed)    Health maintenance  Screening mammogram ordered    Follow-up  Wellness exam/physical in May  (Fasting lab work is ordered to complete 3 to 5 days prior to visit)

## 2024-03-14 NOTE — PROGRESS NOTES
Subjective   Patient ID: Taylor Blankenship is a 88 y.o. female who presents for Pain (Chest (upper left area), Abdominal (mid upper, LLQ)).    Patient presents for specific concerns  (Patient presents alone, usually has family member.  Expressive aphasia presents limiting factor regarding history)    Left anterior/posterior chest pain (chronic, worsening)  Patient states this pain has been present for more than 1 year, though seems to have increased in frequency and in intensity  No cough, wheeze, increased shortness of breath.  Patient does have history of lung cancer and is concerned about recurrence.    Left-sided thoracic back pain (acute)  Patient describes a second kind of pain, onset within the last several weeks.  Worse with lying on left side or when reaching with left arm.  Denies any trauma or fall.  No rash.  No medication taken.    Generalized abdominal pain (subacute)  2 to 3-month history of abdominal pain.  Patient describes pain in various locations including epigastric, left abdomen, suprapubic area  No nausea, vomiting, black stools, blood in stool, fever.  Chart indicates history of Patel's esophagus.  Denies any heartburn or dysphagia  Also with history of sigmoidectomy for diverticular disease  Previous gastroenterologist in [place] was Dr. Vega (records to be requested)    Osteoporosis  We have discussed the plan is to initiate Prolia treatment    Health maintenance  Screening mammogram discussed and ordered (previous mammogram in 2021, [place], normal)      Pain  Associated symptoms include abdominal pain and chest pain. Pertinent negatives include no constipation, dysuria, headaches, nausea, rash, shortness of breath or vomiting.        Review of Systems   Respiratory:  Negative for cough and shortness of breath.    Cardiovascular:  Positive for chest pain. Negative for palpitations and leg swelling.   Gastrointestinal:  Positive for abdominal pain. Negative for  constipation, nausea and vomiting.   Genitourinary:  Negative for dysuria.   Musculoskeletal:  Positive for back pain. Negative for arthralgias.   Skin:  Negative for rash.   Neurological:  Negative for headaches.       Objective   /80 (BP Location: Left arm, Patient Position: Sitting, BP Cuff Size: Adult)   Pulse 80   Wt 48.1 kg (106 lb)   LMP  (LMP Unknown)   SpO2 98%   BMI 18.78 kg/m²     Physical Exam  Vitals reviewed.   HENT:      Mouth/Throat:      Mouth: Mucous membranes are moist.   Eyes:      Conjunctiva/sclera: Conjunctivae normal.   Cardiovascular:      Rate and Rhythm: Normal rate and regular rhythm.   Pulmonary:      Effort: Pulmonary effort is normal.      Breath sounds: Normal breath sounds.      Comments: Slightly diminished breath sounds at right base (previous lobectomy)  Abdominal:      General: Bowel sounds are normal.      Comments: Epigastric tenderness on palpation.  No rebound.   Musculoskeletal:      Right lower leg: No edema.      Left lower leg: No edema.   Neurological:      Mental Status: She is alert.         Assessment/Plan     Left anterior/posterior chest pain (chronic, though with increased symptoms)  History of lung cancer (most recent CT chest 7/2023 in [place] stable)  CT chest without contrast ordered  New patient pulmonary consultation scheduled on 3/26    Generalized (epigastric, suprapubic, left lower abdomen) abdominal pain  History of lung cancer  CT abdomen/pelvis with contrast  New patient gastroenterology consultation scheduled on 6/3    Patel's esophagus (per chart history)  New patient gastroenterology consultation scheduled on 6/3  Request for records to be sent to prior gastroenterologist, Dr. Vega    Acute left-sided thoracic back pain, possible strain/evaluate for thoracic arthritis or lumbar vertebral fracture  X-ray thoracic spine    Osteoporosis  Plan to initiate Prolia treatment at infusion center (order to be placed)    Primary  hypertension (improved on treatment)  Continue losartan 12.5 mg daily    Health maintenance  Screening mammogram ordered    Follow-up  Wellness exam/physical in May  (Fasting lab work is ordered to complete 3 to 5 days prior to visit)    Isacc Roldan MD

## 2024-03-20 ENCOUNTER — HOSPITAL ENCOUNTER (OUTPATIENT)
Dept: RADIOLOGY | Facility: CLINIC | Age: 89
Discharge: HOME | End: 2024-03-20
Payer: MEDICARE

## 2024-03-20 VITALS — WEIGHT: 106.04 LBS | HEIGHT: 63 IN | BODY MASS INDEX: 18.79 KG/M2

## 2024-03-20 DIAGNOSIS — Z12.31 ENCOUNTER FOR SCREENING MAMMOGRAM FOR MALIGNANT NEOPLASM OF BREAST: ICD-10-CM

## 2024-03-20 PROCEDURE — 77063 BREAST TOMOSYNTHESIS BI: CPT | Performed by: RADIOLOGY

## 2024-03-20 PROCEDURE — 77067 SCR MAMMO BI INCL CAD: CPT

## 2024-03-20 PROCEDURE — 77067 SCR MAMMO BI INCL CAD: CPT | Performed by: RADIOLOGY

## 2024-03-26 ENCOUNTER — APPOINTMENT (OUTPATIENT)
Dept: PULMONOLOGY | Facility: CLINIC | Age: 89
End: 2024-03-26
Payer: MEDICARE

## 2024-03-28 ENCOUNTER — APPOINTMENT (OUTPATIENT)
Dept: RADIOLOGY | Facility: CLINIC | Age: 89
End: 2024-03-28
Payer: MEDICARE

## 2024-04-01 ENCOUNTER — APPOINTMENT (OUTPATIENT)
Dept: RADIOLOGY | Facility: HOSPITAL | Age: 89
End: 2024-04-01
Payer: MEDICARE

## 2024-04-08 ENCOUNTER — APPOINTMENT (OUTPATIENT)
Dept: RADIOLOGY | Facility: HOSPITAL | Age: 89
End: 2024-04-08
Payer: MEDICARE

## 2024-04-09 ENCOUNTER — HOSPITAL ENCOUNTER (OUTPATIENT)
Dept: RADIOLOGY | Facility: EXTERNAL LOCATION | Age: 89
Discharge: HOME | End: 2024-04-09

## 2024-04-09 DIAGNOSIS — M81.0 AGE-RELATED OSTEOPOROSIS WITHOUT CURRENT PATHOLOGICAL FRACTURE: Primary | ICD-10-CM

## 2024-04-09 PROBLEM — Z90.2 S/P LOBECTOMY OF LUNG: Status: ACTIVE | Noted: 2024-04-09

## 2024-04-09 PROBLEM — Z85.118 HISTORY OF PRIMARY MALIGNANT NEOPLASM OF RIGHT LUNG: Status: ACTIVE | Noted: 2024-04-09

## 2024-04-09 RX ORDER — EPINEPHRINE 0.3 MG/.3ML
0.3 INJECTION SUBCUTANEOUS EVERY 5 MIN PRN
Status: CANCELLED | OUTPATIENT
Start: 2024-06-10

## 2024-04-09 RX ORDER — ALBUTEROL SULFATE 0.83 MG/ML
3 SOLUTION RESPIRATORY (INHALATION) AS NEEDED
Status: CANCELLED | OUTPATIENT
Start: 2024-06-10

## 2024-04-09 RX ORDER — FAMOTIDINE 10 MG/ML
20 INJECTION INTRAVENOUS ONCE AS NEEDED
Status: CANCELLED | OUTPATIENT
Start: 2024-06-10

## 2024-04-09 RX ORDER — DIPHENHYDRAMINE HYDROCHLORIDE 50 MG/ML
50 INJECTION INTRAMUSCULAR; INTRAVENOUS AS NEEDED
Status: CANCELLED | OUTPATIENT
Start: 2024-06-10

## 2024-04-10 ENCOUNTER — HOSPITAL ENCOUNTER (OUTPATIENT)
Dept: RADIOLOGY | Facility: CLINIC | Age: 89
Discharge: HOME | End: 2024-04-10
Payer: MEDICARE

## 2024-04-10 DIAGNOSIS — C34.12: ICD-10-CM

## 2024-04-10 PROCEDURE — 71250 CT THORAX DX C-: CPT | Performed by: RADIOLOGY

## 2024-04-10 PROCEDURE — 71250 CT THORAX DX C-: CPT

## 2024-04-15 ENCOUNTER — HOSPITAL ENCOUNTER (OUTPATIENT)
Dept: RADIOLOGY | Facility: HOSPITAL | Age: 89
Discharge: HOME | End: 2024-04-15
Payer: MEDICARE

## 2024-04-15 DIAGNOSIS — R10.84 GENERALIZED ABDOMINAL PAIN: ICD-10-CM

## 2024-04-15 PROCEDURE — 2550000001 HC RX 255 CONTRASTS: Performed by: INTERNAL MEDICINE

## 2024-04-15 PROCEDURE — 74177 CT ABD & PELVIS W/CONTRAST: CPT | Performed by: STUDENT IN AN ORGANIZED HEALTH CARE EDUCATION/TRAINING PROGRAM

## 2024-04-15 PROCEDURE — 74177 CT ABD & PELVIS W/CONTRAST: CPT

## 2024-04-15 RX ADMIN — IOHEXOL 75 ML: 350 INJECTION, SOLUTION INTRAVENOUS at 15:07

## 2024-05-15 ENCOUNTER — OFFICE VISIT (OUTPATIENT)
Dept: PRIMARY CARE | Facility: CLINIC | Age: 89
End: 2024-05-15
Payer: MEDICARE

## 2024-05-15 VITALS
SYSTOLIC BLOOD PRESSURE: 110 MMHG | HEART RATE: 83 BPM | HEIGHT: 62 IN | OXYGEN SATURATION: 97 % | DIASTOLIC BLOOD PRESSURE: 64 MMHG | BODY MASS INDEX: 18.4 KG/M2 | WEIGHT: 100 LBS

## 2024-05-15 DIAGNOSIS — G31.01 PRIMARY PROGRESSIVE APHASIA (MULTI): ICD-10-CM

## 2024-05-15 DIAGNOSIS — K22.70 BARRETT'S ESOPHAGUS WITHOUT DYSPLASIA: ICD-10-CM

## 2024-05-15 DIAGNOSIS — Z23 NEED FOR VACCINATION: ICD-10-CM

## 2024-05-15 DIAGNOSIS — H91.93 BILATERAL HEARING LOSS, UNSPECIFIED HEARING LOSS TYPE: ICD-10-CM

## 2024-05-15 DIAGNOSIS — Z00.00 WELLNESS EXAMINATION: ICD-10-CM

## 2024-05-15 DIAGNOSIS — F02.80 ALZHEIMER'S DISEASE (MULTI): ICD-10-CM

## 2024-05-15 DIAGNOSIS — I10 PRIMARY HYPERTENSION: ICD-10-CM

## 2024-05-15 DIAGNOSIS — C34.12: ICD-10-CM

## 2024-05-15 DIAGNOSIS — R91.1 NODULE OF RIGHT LUNG: ICD-10-CM

## 2024-05-15 DIAGNOSIS — Z85.828 HISTORY OF SKIN CANCER: ICD-10-CM

## 2024-05-15 DIAGNOSIS — H35.3190 NONEXUDATIVE AGE-RELATED MACULAR DEGENERATION, UNSPECIFIED LATERALITY, UNSPECIFIED STAGE: ICD-10-CM

## 2024-05-15 DIAGNOSIS — Z00.00 MEDICARE ANNUAL WELLNESS VISIT, SUBSEQUENT: Primary | ICD-10-CM

## 2024-05-15 DIAGNOSIS — K59.09 INTERMITTENT CONSTIPATION: ICD-10-CM

## 2024-05-15 DIAGNOSIS — F02.80 PRIMARY PROGRESSIVE APHASIA (MULTI): ICD-10-CM

## 2024-05-15 DIAGNOSIS — M81.0 AGE-RELATED OSTEOPOROSIS WITHOUT CURRENT PATHOLOGICAL FRACTURE: ICD-10-CM

## 2024-05-15 DIAGNOSIS — G30.9 ALZHEIMER'S DISEASE (MULTI): ICD-10-CM

## 2024-05-15 PROCEDURE — G0439 PPPS, SUBSEQ VISIT: HCPCS | Performed by: INTERNAL MEDICINE

## 2024-05-15 PROCEDURE — UHSPHYS PR UH SELECT PHYSICAL: Performed by: INTERNAL MEDICINE

## 2024-05-15 PROCEDURE — G0009 ADMIN PNEUMOCOCCAL VACCINE: HCPCS | Performed by: INTERNAL MEDICINE

## 2024-05-15 PROCEDURE — 3074F SYST BP LT 130 MM HG: CPT | Performed by: INTERNAL MEDICINE

## 2024-05-15 PROCEDURE — 3078F DIAST BP <80 MM HG: CPT | Performed by: INTERNAL MEDICINE

## 2024-05-15 PROCEDURE — 1159F MED LIST DOCD IN RCRD: CPT | Performed by: INTERNAL MEDICINE

## 2024-05-15 PROCEDURE — 90677 PCV20 VACCINE IM: CPT | Performed by: INTERNAL MEDICINE

## 2024-05-15 RX ORDER — PSYLLIUM HUSK 3.4 G/5.8G
POWDER ORAL
Start: 2024-05-15

## 2024-05-15 NOTE — PATIENT INSTRUCTIONS
Wellness exam/physical ([facility])  Complete fasting lab work in the next 2 weeks, orders in place  Regular exercise such as walking daily is recommended  Well-balanced diet rich in fruits, vegetables, fiber, lean protein recommended  Continued routine follow-up with dentistry and ophthalmology recommended  Prevnar-20 vaccine given today  Tdap vaccine is recommended (needs to be received at pharmacy per insurance)  Providing a copy of advance directives (DURABLE POWER OF  for healthcare) to place in chart recommended    Primary hypertension  Continue losartan 12.5 mg daily    Alzheimer's disease  Primary progressive aphasia  Continue memantine 10 mg twice a day  Keep mind active with puzzles, reading, socializing  Continue routine follow-up with [facility] neurology, Dr. Curran    Patel's esophagus (per chart)  Records to be requested from Dr. Isacc Vega (gastroenterologist in [place])  Upcoming gastroenterology consult with Dr. Page scheduled on 6/3    History of lung cancer  Current lung nodule  Continue routine follow-up and CT chest surveillance per Dr. Barton ([facility] pulmonary medicine).    Macular degeneration  Continue routine follow-up with Dr. Smart, [facility] ophthalmology    History of recurrent skin cancer  Limit sun exposure  Continue routine follow-up with [facility] dermatology, Socorro Warren NP    Osteoporosis  Plan is for treatment with Prolia, initial dose to be scheduled    Bilateral hearing loss  Referral to audiology provided    Follow-up  1.  Office visit in September 2.  Wellness exam/physical in 1 year, on/after 5/16/2025

## 2024-05-15 NOTE — PROGRESS NOTES
Alexandria Blankenship is a 72 year old here for a Medicare Annual Wellness Visit     Health Risk Assessment  In general, health is:  Good     Concerns with balance:  No     Concerns with teeth or dentures:  No     Concerns with sexual function:  No     Felt anxious, stressed, angry, irritable, lonely, isolated, or had thoughts of hurting themself:  No     Has little interest or pleasure in doing things:  No     Bothered by feeling down, depressed, or hopeless:  No     Needs help with grocery shopping, cooking, housework, bathing, grooming, dressing, eating, sitting or standing, walking, using the toilet, handling finances, taking medications, using the telephone, or driving:  Yes     Following safety precautions in the home environment and vehicle: removed throw rugs from floors, installed grab bars in the bathroom, handrails in stairwells, having adequate lighting, wearing seatbelt at all times?:  Yes     Smokes cigarettes, vapes, or chew tobacco:  No     Eats healthy foods including fruits, vegetables, whole grains, and fiber-rich foods:  Most days     Number of days per week engages in exercise:  3-4 days per week     Average alcohol consumption: Rare        Current Providers  Specialists: I have reviewed specialist-related care of the patient in the medical record.     Medical/Family history review  Reviewed and updated problem list, medical/surgical/family/social history, medications, and allergies.     Opioid use review  Patient use of opioids:  No           Depression screening  Depression Screening PHQ-2 Score   2/14/2023 0         Depression screening tool completed and reviewed. Based on score and interview, patient is not at risk for depression. Screening tool discussed with patient, and I recommended no further intervention at this time.     Cognitive screening  Mini Cog Score:  Not indicated as on treatment for AD     Cognitive screening reviewed and plan:  Yes, on therapy and sees [facility] neurology    "  Functional Observation  Was the patient's timed Up & Go test unsteady or >= 12 seconds?  No     Advance Care Planning  End of Life planning discussed, including patient's advanced directive wishes:  Yes    Vision and Hearing assessment  Visual acuity (required for Welcome to Medicare):  Sees ophthalmology  Hearing Evaluation:  Hearing loss    ====================================================    /64 (BP Location: Left arm, Patient Position: Sitting, BP Cuff Size: Adult)   Pulse 83   Ht 1.575 m (5' 2\")   Wt 45.4 kg (100 lb)   LMP  (LMP Unknown)   SpO2 97%   BMI 18.29 kg/m²     Chief Complaint   Patient presents with    Annual Exam       Medicare annual wellness visit (subsequent)    ==============================    Wellness exam/physical ([facility])    Primary hypertension  Patient states systolic home blood pressure readings less than 130.  No blood pressure log available for review.  She remains on losartan 12.5 mg daily.    Alzheimer's disease  Primary progressive aphasia  At most recent visit with neurology Dr. Curran in January, memantine increased from 5 mg to 10 mg twice a day.  Patient appears alert.  She continues to have expressive aphasia    Ptael's esophagus (per chart)  Records to be requested from Dr. Isacc Vega (gastroenterologist in [place])  Upcoming gastroenterology consult with Dr. Page scheduled on 6/3  Patient denies any dysphagia or odynophagia.    History of lung cancer  Current lung nodule  Recent first visit with [facility] pulmonologist, Dr. Barton.  CT chest recently completed at  reviewed and follow-up CT chest recommended in 5 months.  No wheezing, shortness of breath    Macular degeneration  Routine follow-up with Dr. Smart, [facility] ophthalmology    History of recurrent skin cancer  Routine follow-up with [facility] dermatologySocorro NP    Osteoporosis  Plan is for treatment with Prolia, initial dose to be scheduled    Bilateral hearing loss  Referral to " audiology provided    Health maintenance  Exercise: Active in her home, no other formal exercise  Breast cancer screening: Normal mammogram in March 2024  Ophthalmology: Up-to-date  Dermatology: Up-to-date          Review of Systems   Constitutional: Negative for fever, weight gain and weight loss.   Eyes:  Negative for blurred vision and visual disturbance.   Cardiovascular:  Negative for chest pain, dyspnea on exertion and palpitations.   Respiratory:  Negative for cough and shortness of breath.    Musculoskeletal:  Negative for joint pain and muscle weakness.   Gastrointestinal:  Negative for abdominal pain, constipation and diarrhea.   Genitourinary:  Negative for dysuria.   Neurological:  Negative for dizziness and headaches.   Psychiatric/Behavioral:  Negative for altered mental status. The patient does not have insomnia.         Physical Exam  Vitals reviewed.   Constitutional:       Appearance: Normal appearance.   HENT:      Head: Normocephalic.      Right Ear: Tympanic membrane normal.      Left Ear: Tympanic membrane normal.      Mouth/Throat:      Mouth: Mucous membranes are moist.   Eyes:      Comments: Left eye with normal appearance.  Right eye with scarring of lateral upper and lower lids, mild clouding of cornea   Neck:      Vascular: No carotid bruit.   Cardiovascular:      Rate and Rhythm: Normal rate and regular rhythm.      Pulses: Normal pulses.   Pulmonary:      Effort: Pulmonary effort is normal.      Breath sounds: Normal breath sounds.   Abdominal:      General: Bowel sounds are normal.      Tenderness: There is no abdominal tenderness.   Genitourinary:     Comments: Breast exam: Normal nipples, no masses or axillary lymphadenopathy bilaterally.    Musculoskeletal:      Right lower leg: No edema.      Left lower leg: No edema.   Lymphadenopathy:      Cervical: No cervical adenopathy.   Neurological:      General: No focal deficit present.      Mental Status: She is alert.   Psychiatric:          Mood and Affect: Mood normal.           Assessment and Plan    Medicare annual wellness visit (subsequent)  Complete fasting lab work in the next 1-2 weeks, orders in place  Regular exercise such as walking daily is recommended  Well-balanced diet rich in fruits, vegetables, fiber, lean protein recommended  Continued routine follow-up with dentistry and ophthalmology recommended  Prevnar-20 vaccine given today  Tdap vaccine is recommended (needs to be received at pharmacy per insurance)  Providing a copy of advance directives (DURABLE POWER OF  for healthcare) to place in chart recommended    =============================    Wellness exam/physical ( [facility])  Complete fasting lab work in the next 2 weeks, orders in place  Regular exercise such as walking daily is recommended  Well-balanced diet rich in fruits, vegetables, fiber, lean protein recommended  Continued routine follow-up with dentistry and ophthalmology recommended  Prevnar-20 vaccine given today  Tdap vaccine is recommended (needs to be received at pharmacy per insurance)  Providing a copy of advance directives (DURABLE POWER OF  for healthcare) to place in chart recommended    Primary hypertension  Continue losartan 12.5 mg daily    Alzheimer's disease  Primary progressive aphasia  Continue memantine 10 mg twice a day  Keep mind active with puzzles, reading, socializing  Continue routine follow-up with [facility] neurology, Dr. Curran    Patel's esophagus (per chart)  Records to be requested from Dr. Isacc Vega (gastroenterologist in [place])  Upcoming gastroenterology consult with Dr. Page scheduled on 6/3    History of lung cancer  Current lung nodule  Continue routine follow-up and CT chest surveillance per Dr. Barton ([facility] pulmonary medicine).    Macular degeneration  Continue routine follow-up with Dr. Smart, [facility] ophthalmology    History of recurrent skin cancer  Limit sun exposure  Continue routine follow-up with  [facility] dermatology, Socorro Warren NP    Osteoporosis  Plan is for treatment with Prolia, initial dose to be scheduled    Bilateral hearing loss  Referral to audiology provided    Follow-up  1.  Office visit in September 2.  Wellness exam/physical in 1 year, on/after 5/16/2025    Isacc Roldan MD

## 2024-05-16 ASSESSMENT — ENCOUNTER SYMPTOMS
ABDOMINAL PAIN: 0
COUGH: 0
CONSTIPATION: 0
BLURRED VISION: 0
INSOMNIA: 0
HEADACHES: 0
PALPITATIONS: 0
DIARRHEA: 0
ALTERED MENTAL STATUS: 0
SHORTNESS OF BREATH: 0
DYSURIA: 0
FEVER: 0
WEIGHT LOSS: 0
DYSPNEA ON EXERTION: 0
WEIGHT GAIN: 0
DIZZINESS: 0

## 2024-05-17 ENCOUNTER — TELEPHONE (OUTPATIENT)
Dept: PRIMARY CARE | Facility: CLINIC | Age: 89
End: 2024-05-17
Payer: MEDICARE

## 2024-05-17 DIAGNOSIS — M81.0 AGE-RELATED OSTEOPOROSIS WITHOUT CURRENT PATHOLOGICAL FRACTURE: ICD-10-CM

## 2024-06-03 ENCOUNTER — OFFICE VISIT (OUTPATIENT)
Dept: GASTROENTEROLOGY | Facility: CLINIC | Age: 89
End: 2024-06-03
Payer: MEDICARE

## 2024-06-03 ENCOUNTER — APPOINTMENT (OUTPATIENT)
Dept: INFUSION THERAPY | Facility: CLINIC | Age: 89
End: 2024-06-03
Payer: MEDICARE

## 2024-06-03 VITALS — OXYGEN SATURATION: 97 % | HEART RATE: 84 BPM | BODY MASS INDEX: 18.69 KG/M2 | HEIGHT: 62 IN | WEIGHT: 101.6 LBS

## 2024-06-03 DIAGNOSIS — K22.70 BARRETT'S ESOPHAGUS WITHOUT DYSPLASIA: ICD-10-CM

## 2024-06-03 PROCEDURE — 99204 OFFICE O/P NEW MOD 45 MIN: CPT | Performed by: INTERNAL MEDICINE

## 2024-06-03 PROCEDURE — 1159F MED LIST DOCD IN RCRD: CPT | Performed by: INTERNAL MEDICINE

## 2024-06-03 PROCEDURE — 1036F TOBACCO NON-USER: CPT | Performed by: INTERNAL MEDICINE

## 2024-06-03 RX ORDER — PANTOPRAZOLE SODIUM 40 MG/1
40 TABLET, DELAYED RELEASE ORAL DAILY
Qty: 30 TABLET | Refills: 11 | Status: SHIPPED | OUTPATIENT
Start: 2024-06-03 | End: 2025-06-03

## 2024-06-03 ASSESSMENT — ENCOUNTER SYMPTOMS
GASTROINTESTINAL NEGATIVE: 1
NEUROLOGICAL NEGATIVE: 1
HEMATOLOGIC/LYMPHATIC NEGATIVE: 1
MUSCULOSKELETAL NEGATIVE: 1
RESPIRATORY NEGATIVE: 1
CONSTITUTIONAL NEGATIVE: 1
CARDIOVASCULAR NEGATIVE: 1
EYES NEGATIVE: 1
ALLERGIC/IMMUNOLOGIC NEGATIVE: 1
PSYCHIATRIC NEGATIVE: 1
ENDOCRINE NEGATIVE: 1

## 2024-06-03 NOTE — PROGRESS NOTES
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 female with PMHx of Alzheimer's related dementia, hypertension, previous cholecystectomy, previous partial colectomy, lung cancer and possible short segment Patel's esophagus who presents to GI clinic for consultation.  She denies any difficulty swallowing, painful swallowing, current abdominal pain, vomiting blood or bowel movement issues.  Most of the history is through the record as she has some dementia with Alzheimer's.  I attempted to review her previous studies from [place] that suggest possible short segment Patel's esophagus versus inflammation and intestinal metaplasia.  At this point she is asymptomatic.    Her medications are reviewed    I reviewed her CAT scan and labs as well    Review of Systems  Review of Systems   Constitutional: Negative.    HENT: Negative.     Eyes: Negative.    Respiratory: Negative.     Cardiovascular: Negative.    Gastrointestinal: Negative.    Endocrine: Negative.    Genitourinary: Negative.    Musculoskeletal: Negative.    Skin: Negative.    Allergic/Immunologic: Negative.    Neurological: Negative.    Hematological: Negative.    Psychiatric/Behavioral: Negative.     All other systems reviewed and are negative.      Past Medical History  she  has no past medical history on file.     Social History  she  reports that she quit smoking about 23 years ago. Her smoking use included cigarettes. She has never used smokeless tobacco. She reports current alcohol use.     Family History  her family history is not on file.     Allergies  Allergies   Allergen Reactions    Adhesive Tape-Silicones Rash     hydrofix    Latex, Natural Rubber Itching and Rash    Donepezil Dizziness    Sutures Unknown       Medications  Current Outpatient Medications   Medication Instructions    cyanocobalamin (VITAMIN B-12) 1,000 mcg, oral, Daily RT    docusate sodium " "(COLACE) 100 mg, oral, Daily    erythromycin (Romycin) 5 mg/gram (0.5 %) ophthalmic ointment APPLY IN THE RIGHT EYE 2 TIMES DAILY    losartan (COZAAR) 12.5 mg, oral, Daily    magnesium oxide 400 mg magnesium capsule 1 each, oral, Daily RT    memantine (NAMENDA) 10 mg, oral, 2 times daily    mometasone (Elocon) 0.1 % cream     psyllium husk, aspartame, (Metamucil Sugar-Free, aspart,) 3.4 gram/5.8 gram powder Take 1 tablespoon in 8 ounces liquid daily        Objective   Visit Vitals  Pulse 84      Physical Exam  Constitutional:       Appearance: Normal appearance.   Eyes:      Conjunctiva/sclera: Conjunctivae normal.   Cardiovascular:      Rate and Rhythm: Normal rate and regular rhythm.   Pulmonary:      Effort: Pulmonary effort is normal.      Breath sounds: Normal breath sounds.   Abdominal:      General: Abdomen is flat. Bowel sounds are normal.      Palpations: Abdomen is soft.   Musculoskeletal:         General: Normal range of motion.      Cervical back: Normal range of motion.   Neurological:      Mental Status: She is alert.           No results found for: \"WBC\", \"HGB\", \"HCT\", \"PLT\"  Lab Results   Component Value Date     03/06/2024    K 4.2 03/06/2024     03/06/2024    CO2 29 03/06/2024    BUN 23 03/06/2024    CREATININE 0.89 03/06/2024    CALCIUM 10.1 03/06/2024    PROT 7.0 03/06/2024    BILITOT 0.5 03/06/2024    ALKPHOS 85 03/06/2024    ALT 12 03/06/2024    AST 19 03/06/2024    GLUCOSE 87 03/06/2024           Alexandria Blankenship \"Taylor\" is a 89 y.o. female who presents to GI clinic for possible short segment Patel's.    Patel's esophagus without dysplasia  Patient is a 89-year-old with dementia consistent with Alzheimer's who apparently has Patel's esophagus and by the reports that I reviewed it is difficult to say if it is truly Patel's or just inflammation.  Biopsies of the distal esophagus on previous endoscopy show inflammation and intestinal metaplasia but there is no definition if " this is long or short segment Patel's.  It is my impression is likely short segment Patel's as they biopsied the GE junction.  She has no symptoms of dysphagia or vomiting blood.  Today she has no abdominal pain and had a CAT scan in March that was unremarkable.  It is my impression we should not pursue any further surveillance endoscopy but she should be on a proton pump inhibitor long-term.  I am going to prescribe pantoprazole 40 mg daily.  I see no other intervention needed at this time.       Gera Page MD

## 2024-06-03 NOTE — ASSESSMENT & PLAN NOTE
Patient is a 89-year-old with dementia consistent with Alzheimer's who apparently has Patel's esophagus and by the reports that I reviewed it is difficult to say if it is truly Patel's or just inflammation.  Biopsies of the distal esophagus on previous endoscopy show inflammation and intestinal metaplasia but there is no definition if this is long or short segment Patel's.  It is my impression is likely short segment Patel's as they biopsied the GE junction.  She has no symptoms of dysphagia or vomiting blood.  Today she has no abdominal pain and had a CAT scan in March that was unremarkable.  It is my impression we should not pursue any further surveillance endoscopy but she should be on a proton pump inhibitor long-term.  I am going to prescribe pantoprazole 40 mg daily.  I see no other intervention needed at this time.

## 2024-06-04 ENCOUNTER — LAB (OUTPATIENT)
Dept: LAB | Facility: LAB | Age: 89
End: 2024-06-04
Payer: MEDICARE

## 2024-06-04 DIAGNOSIS — M81.0 AGE-RELATED OSTEOPOROSIS WITHOUT CURRENT PATHOLOGICAL FRACTURE: ICD-10-CM

## 2024-06-04 DIAGNOSIS — Z00.00 WELLNESS EXAMINATION: ICD-10-CM

## 2024-06-04 LAB
25(OH)D3 SERPL-MCNC: 50 NG/ML (ref 30–100)
ALBUMIN SERPL BCP-MCNC: 4.4 G/DL (ref 3.4–5)
ALP SERPL-CCNC: 76 U/L (ref 33–136)
ALT SERPL W P-5'-P-CCNC: 14 U/L (ref 7–45)
ANION GAP SERPL CALC-SCNC: 14 MMOL/L (ref 10–20)
APPEARANCE UR: CLEAR
AST SERPL W P-5'-P-CCNC: 19 U/L (ref 9–39)
BASOPHILS # BLD AUTO: 0.07 X10*3/UL (ref 0–0.1)
BASOPHILS NFR BLD AUTO: 1.1 %
BILIRUB SERPL-MCNC: 0.6 MG/DL (ref 0–1.2)
BILIRUB UR STRIP.AUTO-MCNC: NEGATIVE MG/DL
BUN SERPL-MCNC: 27 MG/DL (ref 6–23)
CALCIUM SERPL-MCNC: 10.1 MG/DL (ref 8.6–10.6)
CHLORIDE SERPL-SCNC: 104 MMOL/L (ref 98–107)
CHOLEST SERPL-MCNC: 217 MG/DL (ref 0–199)
CHOLESTEROL/HDL RATIO: 3.7
CO2 SERPL-SCNC: 27 MMOL/L (ref 21–32)
COLOR UR: ABNORMAL
CREAT SERPL-MCNC: 0.82 MG/DL (ref 0.5–1.05)
CRP SERPL HS-MCNC: 0.2 MG/L
EGFRCR SERPLBLD CKD-EPI 2021: 69 ML/MIN/1.73M*2
EOSINOPHIL # BLD AUTO: 0.2 X10*3/UL (ref 0–0.4)
EOSINOPHIL NFR BLD AUTO: 3.2 %
ERYTHROCYTE [DISTWIDTH] IN BLOOD BY AUTOMATED COUNT: 12.8 % (ref 11.5–14.5)
EST. AVERAGE GLUCOSE BLD GHB EST-MCNC: 108 MG/DL
GLUCOSE SERPL-MCNC: 92 MG/DL (ref 74–99)
GLUCOSE UR STRIP.AUTO-MCNC: NORMAL MG/DL
HBA1C MFR BLD: 5.4 %
HCT VFR BLD AUTO: 45.5 % (ref 36–46)
HDLC SERPL-MCNC: 58.6 MG/DL
HGB BLD-MCNC: 14.9 G/DL (ref 12–16)
IMM GRANULOCYTES # BLD AUTO: 0.01 X10*3/UL (ref 0–0.5)
IMM GRANULOCYTES NFR BLD AUTO: 0.2 % (ref 0–0.9)
KETONES UR STRIP.AUTO-MCNC: NEGATIVE MG/DL
LDLC SERPL CALC-MCNC: 134 MG/DL
LEUKOCYTE ESTERASE UR QL STRIP.AUTO: ABNORMAL
LYMPHOCYTES # BLD AUTO: 1.99 X10*3/UL (ref 0.8–3)
LYMPHOCYTES NFR BLD AUTO: 32.1 %
MCH RBC QN AUTO: 28.7 PG (ref 26–34)
MCHC RBC AUTO-ENTMCNC: 32.7 G/DL (ref 32–36)
MCV RBC AUTO: 88 FL (ref 80–100)
MONOCYTES # BLD AUTO: 0.42 X10*3/UL (ref 0.05–0.8)
MONOCYTES NFR BLD AUTO: 6.8 %
NEUTROPHILS # BLD AUTO: 3.5 X10*3/UL (ref 1.6–5.5)
NEUTROPHILS NFR BLD AUTO: 56.6 %
NITRITE UR QL STRIP.AUTO: NEGATIVE
NON HDL CHOLESTEROL: 158 MG/DL (ref 0–149)
NRBC BLD-RTO: 0 /100 WBCS (ref 0–0)
PH UR STRIP.AUTO: 6 [PH]
PLATELET # BLD AUTO: 269 X10*3/UL (ref 150–450)
POTASSIUM SERPL-SCNC: 3.9 MMOL/L (ref 3.5–5.3)
PROT SERPL-MCNC: 6.9 G/DL (ref 6.4–8.2)
PROT UR STRIP.AUTO-MCNC: ABNORMAL MG/DL
RBC # BLD AUTO: 5.19 X10*6/UL (ref 4–5.2)
RBC # UR STRIP.AUTO: ABNORMAL /UL
RBC #/AREA URNS AUTO: NORMAL /HPF
SODIUM SERPL-SCNC: 141 MMOL/L (ref 136–145)
SP GR UR STRIP.AUTO: 1.02
TRIGL SERPL-MCNC: 121 MG/DL (ref 0–149)
TSH SERPL-ACNC: 1.21 MIU/L (ref 0.44–3.98)
UROBILINOGEN UR STRIP.AUTO-MCNC: NORMAL MG/DL
VLDL: 24 MG/DL (ref 0–40)
WBC # BLD AUTO: 6.2 X10*3/UL (ref 4.4–11.3)
WBC #/AREA URNS AUTO: NORMAL /HPF

## 2024-06-04 PROCEDURE — 84443 ASSAY THYROID STIM HORMONE: CPT

## 2024-06-04 PROCEDURE — 85025 COMPLETE CBC W/AUTO DIFF WBC: CPT

## 2024-06-04 PROCEDURE — 36415 COLL VENOUS BLD VENIPUNCTURE: CPT

## 2024-06-04 PROCEDURE — 80053 COMPREHEN METABOLIC PANEL: CPT

## 2024-06-04 PROCEDURE — 83036 HEMOGLOBIN GLYCOSYLATED A1C: CPT

## 2024-06-04 PROCEDURE — 86141 C-REACTIVE PROTEIN HS: CPT

## 2024-06-04 PROCEDURE — 80061 LIPID PANEL: CPT

## 2024-06-04 PROCEDURE — 81001 URINALYSIS AUTO W/SCOPE: CPT

## 2024-06-04 PROCEDURE — 82306 VITAMIN D 25 HYDROXY: CPT

## 2024-06-10 ENCOUNTER — INFUSION (OUTPATIENT)
Dept: INFUSION THERAPY | Facility: CLINIC | Age: 89
End: 2024-06-10
Payer: MEDICARE

## 2024-06-10 ENCOUNTER — APPOINTMENT (OUTPATIENT)
Dept: DERMATOLOGY | Facility: CLINIC | Age: 89
End: 2024-06-10
Payer: MEDICARE

## 2024-06-10 VITALS
OXYGEN SATURATION: 95 % | BODY MASS INDEX: 18.47 KG/M2 | WEIGHT: 100.97 LBS | SYSTOLIC BLOOD PRESSURE: 135 MMHG | TEMPERATURE: 97.2 F | RESPIRATION RATE: 16 BRPM | DIASTOLIC BLOOD PRESSURE: 83 MMHG | HEART RATE: 41 BPM

## 2024-06-10 DIAGNOSIS — M81.0 AGE-RELATED OSTEOPOROSIS WITHOUT CURRENT PATHOLOGICAL FRACTURE: ICD-10-CM

## 2024-06-10 PROCEDURE — 96372 THER/PROPH/DIAG INJ SC/IM: CPT | Performed by: NURSE PRACTITIONER

## 2024-06-10 RX ORDER — ALBUTEROL SULFATE 0.83 MG/ML
3 SOLUTION RESPIRATORY (INHALATION) AS NEEDED
OUTPATIENT
Start: 2024-12-01

## 2024-06-10 RX ORDER — FAMOTIDINE 10 MG/ML
20 INJECTION INTRAVENOUS ONCE AS NEEDED
OUTPATIENT
Start: 2024-12-01

## 2024-06-10 RX ORDER — EPINEPHRINE 0.3 MG/.3ML
0.3 INJECTION SUBCUTANEOUS EVERY 5 MIN PRN
OUTPATIENT
Start: 2024-12-01

## 2024-06-10 RX ORDER — DIPHENHYDRAMINE HYDROCHLORIDE 50 MG/ML
50 INJECTION INTRAMUSCULAR; INTRAVENOUS AS NEEDED
OUTPATIENT
Start: 2024-12-01

## 2024-06-10 ASSESSMENT — ENCOUNTER SYMPTOMS
ARTHRALGIAS: 0
DIARRHEA: 0
HEMATURIA: 0
DIZZINESS: 0
SORE THROAT: 0
TROUBLE SWALLOWING: 0
LEG SWELLING: 0
SHORTNESS OF BREATH: 0
DYSURIA: 0
CONSTIPATION: 0
EXTREMITY WEAKNESS: 0
FREQUENCY: 0
FEVER: 0
EYE PROBLEMS: 0
COUGH: 0
NERVOUS/ANXIOUS: 0
NUMBNESS: 0
ABDOMINAL PAIN: 0
BRUISES/BLEEDS EASILY: 0
APPETITE CHANGE: 0
WHEEZING: 0
MYALGIAS: 0
NAUSEA: 0
CONFUSION: 1
DEPRESSION: 0
VOMITING: 0
CHILLS: 0
SLEEP DISTURBANCE: 0
HEADACHES: 0
WOUND: 0
FATIGUE: 0
LIGHT-HEADEDNESS: 0
PALPITATIONS: 0

## 2024-06-10 NOTE — PROGRESS NOTES
[facility]   Infusion Clinic Note   Date: Rosa 10, 2024   Name: Alexandria Blankenship  : 1935   MRN: [id number]         Reason for Visit: Injections and New Patient Visit (1st Prolia 60mg subcutaneous injection)         Today: We administered denosumab.       Visit Type: INJECTION       Ordered By: Isacc Roldan MD      Accompanied by:  Sandra      Diagnosis: Age-related osteoporosis without current pathological fracture       Allergies:   Allergies as of 06/10/2024 - Reviewed 06/10/2024   Allergen Reaction Noted    Adhesive tape-silicones Rash 2018    Latex, natural rubber Itching and Rash 2016    Donepezil Dizziness 2024    Sutures Unknown 2018         Current Medications has a current medication list which includes the following prescription(s): cyanocobalamin, docusate sodium, erythromycin, losartan, magnesium oxide, memantine, mometasone, pantoprazole, and metamucil sugar-free (aspart), and the following Facility-Administered Medications: denosumab and denosumab.       Vitals:   Vitals:    06/10/24 1505   BP: 135/83  Comment: nurse notified   Pulse: (!) 41  Comment: nurse notified   Resp: 16   Temp: 36.2 °C (97.2 °F)   SpO2: 95%   Weight: 45.8 kg (100 lb 15.5 oz)             Infusion Pre-procedure Checklist:   - Allergies reviewed: yes   - Medications reviewed: yes       - Previous reaction to current treatment: n/a      Assess patient for the concerns below. Document provider notification as appropriate.  - Active or recent infection with/without current antibiotic use: no  - Recent or planned invasive dental work: no  - Recent or planned surgeries: no  - Recently received or plans to receive vaccinations: no  - Has treatment related toxicities: n/a  - Is pregnant:  no      Pain: 0   - Is the pain different from normal: n/a   - Is the pain tolerable: n/a   - Is your Doctor aware:  n/a               Fall Risk Screening:         Review Of Systems:  Review of  Systems   Constitutional:  Negative for appetite change, chills, fatigue and fever.   HENT:   Negative for hearing loss, sore throat, tinnitus and trouble swallowing.    Eyes:  Negative for eye problems.        Wears glasses for distance   Respiratory:  Negative for cough, shortness of breath and wheezing.    Cardiovascular:  Negative for chest pain, leg swelling and palpitations.   Gastrointestinal:  Negative for abdominal pain, constipation, diarrhea, nausea and vomiting.   Genitourinary:  Negative for dysuria, frequency and hematuria.    Musculoskeletal:  Negative for arthralgias and myalgias.   Skin:  Negative for itching, rash and wound.   Neurological:  Negative for dizziness, extremity weakness, headaches, light-headedness and numbness.   Hematological:  Does not bruise/bleed easily.   Psychiatric/Behavioral:  Positive for confusion. Negative for depression and sleep disturbance. The patient is not nervous/anxious.         H/O Dementia, Alzheimers         Infusion Readiness:   - Assessment Concerns Related to Infusion: No  - Provider notified: n/a      Document Below Only If Indicated:   New Patient Education:    NEW PATIENT MEDICATION EDUCATION PT PROVIDED WITH WRITTEN ([employer] PT EDUCATION SHEET) AND VERBAL EDUCATION REGARDING MEDICATION GIVEN. VERIFIED MEDICATION NAME WITH PATIENT AND DISCUSSED REASON FOR USE. BRIEFLY DISCUSSED HOW MEDICATION WORKS AND EDUCATED ON GOAL OF TREATMENT, FREQUENCY OF TREATMENT, ADVERSE RXN'S AND COMMON SIDE EFFECTS TO MONITOR FOR. INSTRUCTED PT TO ASSURE THAT ALL PROVIDERS INCLUDING DENTISTS ARE AWARE OF MEDICATION RECEIVED. DISCUSSED FLOW OF VISIT AND ORIENTED TO INFUSION CENTER. PT VERBALIZES UNDERSTANDING. CALL LIGHT PROVIDED AND PT AWARE TO ALERT STAFF OF ANY CONCERNS DURING TREATMENT.        Treatment Conditions & Drug Specific Questions:    Denosumab  (PROLIA. XGEVA)    (Unless otherwise specified on patient specific therapy plan):     TREATMENT CONDITIONS:  Unless  otherwise specified on patient specific therapy plan HOLD and notify provider prior to proceeding with today's injection if patients:  o Calcium is LESS THAN 8.6 mg/dL OR  Ionized Calcium LESS THAN 1.1 mmol/L  o Recent or planned invasive dental procedure (within 4 weeks)    Lab Results   Component Value Date    CALCIUM 10.1 06/04/2024      Labs reviewed and patient meets treatment conditions? Yes    DRUG SPECIFIC QUESTIONS:  Is the patient taking calcium and vitamin D? No  (Recommended)    Pt Instructed on following risks: (1) hypocalcemia, (2) osteonecrosis of the jaw, (3) atypical femoral fractures, (4) serious infections, and (5) dermatologic reactions?  Yes      REMINDER:  PREGNANCY CATEGORY X DRUG. OBTAIN NEGTATIVE PREGNANCY TEST PRIOR TO FIRST INFUSION FOR WOMEN OF CHILDBEARING ABILITY   REMS DRUG    Recommended Vitals/Observation:  Vitals: Obtain vitals prior to injection.  Observation: Patient may leave immediately following injection.        Weight Based Drug Calculations:    WEIGHT BASED DRUGS: NOT APPLICABLE / FLAT DOSE          Initiated By: Adwoa Melgoza LPN

## 2024-06-10 NOTE — PATIENT INSTRUCTIONS
Today :We administered denosumab. Prolia 60mg subcutaneous injection left upper arm  Blood Calcium within 28 days of next Prolia appointment    For:   1. Age-related osteoporosis without current pathological fracture       Your next appointment is due in: 6 months     Please read the  Medication Guide that was given to you and reviewed during todays visit.     (Tell all doctors including dentists that you are taking this medication)     Go to the emergency room or call 911 if:  -You have signs of allergic reaction:   -Rash, hives, itching.   -Swollen, blistered, peeling skin.   -Swelling of face, lips, mouth, tongue or throat.   -Tightness of chest, trouble breathing, swallowing or talking     Call your doctor:  - If injection site gets red, warm, swollen, itchy or leaks fluid or pus.     (Leave band aid on your injection site for at least 2 hours and keep area clean and dry  - If you get sick or have symptoms of infection or are not feeling well for any reason.    (Wash your hands often, stay away from people who are sick)  - If you have side effects from your medication that do not go away or are bothersome.     (Refer to the teaching your nurse gave you for side effects to call your doctor about)    - Common side effects may include:  stuffy nose, headache, feeling tired, muscle aches, upset stomach  - Before receiving any vaccines     - Call [facility] at   If:  - You get sick, are on antibiotics, have had a recent vaccine, have surgery or dental work and your doctor wants your visit rescheduled.  - You need to cancel and reschedule your visit for any reason. Call at least 2 days before your visit if you need to cancel.   - Your insurance changes before your next visit.    (We will need to get approval from your new insurance. This can take up to two weeks.)     The [facility] is opened Monday thru Friday. We are closed on weekends and holidays.   Voice mail will take your  call if the center is closed. If you leave a message please allow 24 hours for a call back during weekdays. If you leave a message on a weekend/holiday, we will call you back the next business day.

## 2024-06-22 ENCOUNTER — TELEPHONE (OUTPATIENT)
Dept: PRIMARY CARE | Facility: CLINIC | Age: 89
End: 2024-06-22
Payer: MEDICARE

## 2024-06-22 NOTE — TELEPHONE ENCOUNTER
[employer] message was sent regarding recent lab results which were all favorable.  I have received notification that [employer] message has not yet been read.  Also, office visit with me for September has not yet been scheduled    Please call her grandson, Richie, regarding the above.  Assured him that the labs looked fine.  Assist with scheduling office visit with me in September.    Isacc Roldan MD

## 2024-06-29 ENCOUNTER — TELEPHONE (OUTPATIENT)
Dept: PRIMARY CARE | Facility: CLINIC | Age: 89
End: 2024-06-29
Payer: MEDICARE

## 2024-06-29 NOTE — TELEPHONE ENCOUNTER
Taylor hit her legs/shins on the bottom of the stairs yesterday.  The left leg has a 2 in wound, right leg has a small scratch.  Yefri Yates MD and nurse bandaged the wound, but they recommended a hospital or MD visit.  Taylor's concerned about the bleeding.  Please call [phone].  Thank you

## 2024-06-29 NOTE — TELEPHONE ENCOUNTER
I spoke with patient.  Last evening, she sustained a left shin wound, likely skin tear.  Nursing came over and dressed the wound after cleansing.  The wound dressing is intact, though there is some blood showing through the dressing.    I spoke with nursing at [facility] who will go assess and redress the wound.  Patient will need follow-up.    She has contacted her family regarding what has happened.    Isacc Roldan MD

## 2024-06-29 NOTE — TELEPHONE ENCOUNTER
The nurse called me back.  She is assessed the patient.  Patient has bilateral lower extremity skin tears, left larger than right.  She describes the left skin tear as large.  Wound was rinsed with wound wash and covered with dry sterile bandage including occlusive bandage.  Nurse will return tomorrow on 6/30 to redress the wound.    Based on the nursing assessment, it may be best for the patient to be referred to  wound clinic.    She may also need home care referral for nursing wound changes.    Isacc Roldan MD

## 2024-07-01 ENCOUNTER — OFFICE VISIT (OUTPATIENT)
Dept: PRIMARY CARE | Facility: CLINIC | Age: 89
End: 2024-07-01
Payer: MEDICARE

## 2024-07-01 VITALS
HEART RATE: 83 BPM | BODY MASS INDEX: 18.29 KG/M2 | DIASTOLIC BLOOD PRESSURE: 76 MMHG | SYSTOLIC BLOOD PRESSURE: 144 MMHG | OXYGEN SATURATION: 98 % | WEIGHT: 100 LBS

## 2024-07-01 DIAGNOSIS — F02.80 ALZHEIMER'S DISEASE (MULTI): ICD-10-CM

## 2024-07-01 DIAGNOSIS — S81.811A NONINFECTED SKIN TEAR OF RIGHT LOWER EXTREMITY, INITIAL ENCOUNTER: ICD-10-CM

## 2024-07-01 DIAGNOSIS — S81.812A NONINFECTED SKIN TEAR OF LEFT LOWER EXTREMITY, INITIAL ENCOUNTER: Primary | ICD-10-CM

## 2024-07-01 DIAGNOSIS — G30.9 ALZHEIMER'S DISEASE (MULTI): ICD-10-CM

## 2024-07-01 PROCEDURE — 3078F DIAST BP <80 MM HG: CPT | Performed by: INTERNAL MEDICINE

## 2024-07-01 PROCEDURE — 3077F SYST BP >= 140 MM HG: CPT | Performed by: INTERNAL MEDICINE

## 2024-07-01 PROCEDURE — 1159F MED LIST DOCD IN RCRD: CPT | Performed by: INTERNAL MEDICINE

## 2024-07-01 PROCEDURE — 99213 OFFICE O/P EST LOW 20 MIN: CPT | Performed by: INTERNAL MEDICINE

## 2024-07-01 NOTE — PATIENT INSTRUCTIONS
Bilateral anterior shin skin tears (left larger than right)  Continue current wound care using wound wash/antibiotic ointment/nonstick covering  Referral to  home care for skilled nursing placed  Appointment at  wound clinic as scheduled on 7/5  Contact office if increased pain, redness, fever, chills, purulent drainage from wound.    Health maintenance  Updated Tdap vaccine to receive at pharmacy recommended.    Follow-up  As scheduled

## 2024-07-01 NOTE — TELEPHONE ENCOUNTER
I spoke with grandson, Richie, who is out of town this week.  I have sent a message to director at [facility] wound clinic regarding getting an appointment for patient in the next 1 to 2 days.  If not, I will see patient in office.    Isacc Roldan MD

## 2024-07-01 NOTE — PROGRESS NOTES
Subjective   Patient ID: Taylor Blankenship is a 89 y.o. female who presents for Wound Check (Bilateral shins. DOI 6-28-24).    Acute visit    Patient sustained bilateral shin skin tears.  On Friday evening, patient bumped right shin on corner of furniture with small skin tear which bled for limited time.  Later that evening, patient bumped left shin on the corner of a cardboard box and sustained a larger skin tear which continued bleed.  She lives at [facility] and nurse from the assisted living facility came and control bleeding and applied dressing.  Patient had wound rechecked by other nurse on Sunday 6/30 with Mepilex occlusive dressing placed.  Today, she presents for follow-up.  She has no pain.    Due to her dementia, patient is homebound.  She has limited endurance and ability to leave the home.  She was able to be scheduled on 7/5 for initial visit at  wound clinic.    Health maintenance  Updated Tdap vaccine to receive at pharmacy recommended.           Review of Systems   Constitutional:  Negative for chills, fatigue and fever.   Respiratory:  Negative for shortness of breath.    Cardiovascular:  Negative for chest pain.   Gastrointestinal:  Negative for abdominal pain.   Skin:  Positive for wound.   Neurological:  Negative for dizziness and headaches.       Objective   /76 (BP Location: Left arm, Patient Position: Sitting, BP Cuff Size: Adult)   Pulse 83   Wt 45.4 kg (100 lb)   LMP  (LMP Unknown)   SpO2 98%   BMI 18.29 kg/m²     Physical Exam  Cardiovascular:      Rate and Rhythm: Normal rate and regular rhythm.   Pulmonary:      Effort: Pulmonary effort is normal.   Musculoskeletal:      Right lower leg: No edema.      Left lower leg: No edema.   Skin:     Comments: Right shin: 1.5 cm skin tear with clean base.  No surrounding erythema.  No purulence.  Left shin: 3.0 cm skin tear with clean base.  No surrounding erythema.  No purulence.  (Image of left shin skin tear available under Media  in chart)   Neurological:      Mental Status: She is alert.         Assessment/Plan     Bilateral anterior shin skin tears (left larger than right)  Continue current wound care using wound wash/antibiotic ointment/nonstick covering  Referral to  home care for skilled nursing placed  Appointment at  wound clinic as scheduled on 7/5  Contact office if increased pain, redness, fever, chills, purulent drainage from wound.    Alzheimer's type dementia  Continue current treatment regimen    Health maintenance  Updated Tdap vaccine to receive at pharmacy recommended.    Follow-up  As scheduled    Isacc Roladn MD

## 2024-07-01 NOTE — TELEPHONE ENCOUNTER
Wound clinic appointment is scheduled on 7-5-24.  Should I schedule Taylor with you this afternoon or tomorrow?

## 2024-07-02 ENCOUNTER — HOME HEALTH ADMISSION (OUTPATIENT)
Dept: HOME HEALTH SERVICES | Facility: HOME HEALTH | Age: 89
End: 2024-07-02
Payer: MEDICARE

## 2024-07-02 ASSESSMENT — ENCOUNTER SYMPTOMS
FEVER: 0
SHORTNESS OF BREATH: 0
HEADACHES: 0
WOUND: 1
ABDOMINAL PAIN: 0
CHILLS: 0
DIZZINESS: 0
FATIGUE: 0

## 2024-07-05 ENCOUNTER — OFFICE VISIT (OUTPATIENT)
Dept: WOUND CARE | Facility: CLINIC | Age: 89
End: 2024-07-05
Payer: MEDICARE

## 2024-07-05 PROCEDURE — 99214 OFFICE O/P EST MOD 30 MIN: CPT

## 2024-07-12 ENCOUNTER — OFFICE VISIT (OUTPATIENT)
Dept: WOUND CARE | Facility: CLINIC | Age: 89
End: 2024-07-12
Payer: MEDICARE

## 2024-07-12 PROCEDURE — 11042 DBRDMT SUBQ TIS 1ST 20SQCM/<: CPT | Performed by: NURSE PRACTITIONER

## 2024-07-12 PROCEDURE — 11042 DBRDMT SUBQ TIS 1ST 20SQCM/<: CPT

## 2024-07-12 PROCEDURE — 99203 OFFICE O/P NEW LOW 30 MIN: CPT | Performed by: NURSE PRACTITIONER

## 2024-07-15 ENCOUNTER — TELEPHONE (OUTPATIENT)
Dept: PRIMARY CARE | Facility: CLINIC | Age: 89
End: 2024-07-15
Payer: MEDICARE

## 2024-07-15 NOTE — TELEPHONE ENCOUNTER
Taylor complains of cough, neck pain (bottom right side).  The cough has been keeping her up at night.  Symptoms started 7-12-24.  No fever.  Please call [phone] around 5:30 p.m. Thanks

## 2024-07-16 NOTE — TELEPHONE ENCOUNTER
Patient and I spoke.  She is describing postnasal drainage.  Denies any fever, chills, discoloration, sneezing, scratchy throat, sore throat, sinus headache, chest cough.    No history of allergies    Possible URI or allergic postnasal drip  Recommend:  1.  Over-the-counter Flonase, 2 sprays in each nostril at bedtime  2.  Mucinex DM, 1 tablet at bedtime    Follow-up as needed.  [employer] message and text has been sent to patient's contact, Rogelio Dumont.    Isacc Roldan MD

## 2024-07-19 ENCOUNTER — OFFICE VISIT (OUTPATIENT)
Dept: WOUND CARE | Facility: CLINIC | Age: 89
End: 2024-07-19
Payer: MEDICARE

## 2024-07-19 PROCEDURE — 11042 DBRDMT SUBQ TIS 1ST 20SQCM/<: CPT

## 2024-07-19 NOTE — PROGRESS NOTES
"[facility]    Alexandria Blankenship \"Taylor\"  1935        SUBJECTIVE  CC:    patient presents for follow up for BLE wounds that are improving. She denies new issues or wound pain. No f,c,n/v        OBJECTIVE  VS: 156/81, 83, 16, 96.6    GENERAL: AA&Ox3, NAD  HEENT: WNL  NECK: no jvd  CHEST: symmetrical rise, no resp distress, no pain, no palpitations  ABD: flat  EXTREMITIES: ambulatory  SKIN / WOUND EXAM:   LLE wound slough and moderate drainage 1.4x2.5x0.1cm  RLE wound slough and moderate drainage 0.5x0.4x0.1cm       PROCEDURE:   After informed consent and time-out, topical lidocaine 2% applied, after 15 minutes, using a #4 curette sharp excisional debridement of subcutaneous necrotic tissue was done to the base of the wounds, increasing wound depths by 0.1cm. EBL 0.5ml. Hemostasis achieved with pressure. Patient tolerated well.      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

## 2024-07-22 ENCOUNTER — TELEPHONE (OUTPATIENT)
Dept: PRIMARY CARE | Facility: CLINIC | Age: 89
End: 2024-07-22
Payer: MEDICARE

## 2024-07-22 DIAGNOSIS — J01.90 ACUTE SINUSITIS, RECURRENCE NOT SPECIFIED, UNSPECIFIED LOCATION: Primary | ICD-10-CM

## 2024-07-22 NOTE — TELEPHONE ENCOUNTER
Patient complains of coughing up excessive phlegm.  Sometimes it causes her to vomit. She also feels very tired, fatigue.  Please call [phone].  Thank you

## 2024-07-24 RX ORDER — DOXYCYCLINE 100 MG/1
100 CAPSULE ORAL 2 TIMES DAILY
Qty: 20 CAPSULE | Refills: 0 | Status: SHIPPED | OUTPATIENT
Start: 2024-07-24 | End: 2024-08-03

## 2024-07-24 NOTE — TELEPHONE ENCOUNTER
Patient and I were able to connect.  We had a phone call on 7/15 for URI symptoms.  Those symptoms has persisted and now she is having yellow thick drainage from the nasal passages, primarily postnasal drip.  No fever or chills.  No increased coughing or wheezing.    Acute sinusitis    Plan:  1.  Doxycycline 100 mg twice a day for 10 days  2.  Flonase nasal spray at bedtime to be continued  3.  Mucinex DM cough tablet to be continued    Follow-up as needed    Isacc Roldan MD

## 2024-07-26 ENCOUNTER — APPOINTMENT (OUTPATIENT)
Dept: WOUND CARE | Facility: CLINIC | Age: 89
End: 2024-07-26
Payer: MEDICARE

## 2024-07-31 NOTE — TELEPHONE ENCOUNTER
Taylor is still coughing up phlegm and she's almost done with antibiotics.  Please call [phone].  Thanks

## 2024-08-02 ENCOUNTER — OFFICE VISIT (OUTPATIENT)
Dept: WOUND CARE | Facility: CLINIC | Age: 89
End: 2024-08-02
Payer: MEDICARE

## 2024-08-02 PROCEDURE — 99212 OFFICE O/P EST SF 10 MIN: CPT

## 2024-08-11 NOTE — TELEPHONE ENCOUNTER
Patient and I connected and she is feeling better.  Cough is gone.    Questions answered about memory loss.  She has been encouraged to keep physically active as well as to exercise her mind with reading, word puzzles, socializing.  Importance of adequate sleep reviewed.    Isacc Roldan MD

## 2024-09-10 ENCOUNTER — APPOINTMENT (OUTPATIENT)
Dept: PRIMARY CARE | Facility: CLINIC | Age: 89
End: 2024-09-10
Payer: MEDICARE

## 2024-09-10 VITALS
HEART RATE: 67 BPM | WEIGHT: 97 LBS | BODY MASS INDEX: 17.74 KG/M2 | SYSTOLIC BLOOD PRESSURE: 118 MMHG | OXYGEN SATURATION: 97 % | DIASTOLIC BLOOD PRESSURE: 80 MMHG

## 2024-09-10 DIAGNOSIS — G31.01 PRIMARY PROGRESSIVE APHASIA (MULTI): ICD-10-CM

## 2024-09-10 DIAGNOSIS — K22.70 BARRETT'S ESOPHAGUS WITHOUT DYSPLASIA: ICD-10-CM

## 2024-09-10 DIAGNOSIS — I10 PRIMARY HYPERTENSION: Primary | ICD-10-CM

## 2024-09-10 DIAGNOSIS — R23.2 HOT FLASHES: ICD-10-CM

## 2024-09-10 DIAGNOSIS — F02.80 ALZHEIMER'S DISEASE: ICD-10-CM

## 2024-09-10 DIAGNOSIS — F02.80 PRIMARY PROGRESSIVE APHASIA (MULTI): ICD-10-CM

## 2024-09-10 DIAGNOSIS — Z85.828 HISTORY OF SKIN CANCER: ICD-10-CM

## 2024-09-10 DIAGNOSIS — G30.9 ALZHEIMER'S DISEASE: ICD-10-CM

## 2024-09-10 DIAGNOSIS — M81.0 AGE-RELATED OSTEOPOROSIS WITHOUT CURRENT PATHOLOGICAL FRACTURE: ICD-10-CM

## 2024-09-10 DIAGNOSIS — R63.6 UNDERWEIGHT: ICD-10-CM

## 2024-09-10 DIAGNOSIS — Z85.118 HISTORY OF PRIMARY MALIGNANT NEOPLASM OF RIGHT LUNG: ICD-10-CM

## 2024-09-10 PROCEDURE — 3079F DIAST BP 80-89 MM HG: CPT | Performed by: INTERNAL MEDICINE

## 2024-09-10 PROCEDURE — 99214 OFFICE O/P EST MOD 30 MIN: CPT | Performed by: INTERNAL MEDICINE

## 2024-09-10 PROCEDURE — 1159F MED LIST DOCD IN RCRD: CPT | Performed by: INTERNAL MEDICINE

## 2024-09-10 PROCEDURE — 3074F SYST BP LT 130 MM HG: CPT | Performed by: INTERNAL MEDICINE

## 2024-09-10 RX ORDER — DONEPEZIL HYDROCHLORIDE 5 MG/1
5 TABLET, FILM COATED ORAL NIGHTLY
COMMUNITY
Start: 2024-08-19

## 2024-09-10 NOTE — PATIENT INSTRUCTIONS
Primary hypertension  Continue losartan 12.5 mg daily     Alzheimer's disease  Primary progressive aphasia  Continue memantine 10 mg twice a day and donepezil 5 mg dialy  Keep mind active with puzzles, reading, socializing  Continue routine follow-up with [facility] neurology, Dr. Curran (visit in 10/2024)     Patel's esophagus  Continue pantoprazole      History of lung cancer  Current lung nodule  Continue routine follow-up and CT chest surveillance per Dr. Barton ([facility] pulmonary medicine).     Macular degeneration  Continue routine follow-up with Dr. Smart, [facility] ophthalmology     History of recurrent skin cancer  Limit sun exposure  Continue routine follow-up with [facility] dermatology, Socorro Warren NP     Osteoporosis  Continue Prolia, next dose scheduled in 12/2024     Episodic sweating, possible hot flashes  (Possible effect of donepezil)  Monitor expectantly  Keep environment cool to prevent overheating  Discussed with neurology, Dr. Curran, at next visit    Underweight (3 pound weight loss since last visit)  Maintain 3 well-balanced meals daily  Recommend adding daily snack (such as Ensure or Boost nutrition supplement or other nutritious snack)    Health maintenance  Vaccines recommended include:  High-dose influenza, COVID-19, and RSV    Follow-up  Office visit in January 2025

## 2024-09-10 NOTE — PROGRESS NOTES
Subjective   Patient ID: Taylor Blankenship is a 89 y.o. female who presents for Follow-up and Hot Flashes.    Primary hypertension  BP stable on losartan 12.5 mg daily     Alzheimer's disease  Primary progressive aphasia  Patient is seen today with her grandson.  Her speech overall seems better.  She remains on memantine and donepezil.  She has an upcoming appointment with [facility] neurology, Dr. Curran (visit in 10/2024)     Patel's esophagus  Patient had appointment with Dr. Page and gastroenterology.  She remains on pantoprazole.  He does not recommend any surveillance EGD.     History of lung cancer  Current lung nodule  Patient has establish care with Dr. Barton, [facility] pulmonary medicine, CT chest surveillance per his office      Macular degeneration  Routine follow-up with Dr. Smart, [facility] ophthalmology     History of recurrent skin cancer  Routine follow-up with [facility] dermatology, Socorro Warren NP     Osteoporosis  Patient started Prolia treatment, most recent treatment 2 months ago, well-tolerated.     Episodic sweating  Patient describes episodic sweating episodes which can last 5 to 10 minutes.  She has sweating of her brow.  Does not need to change her close.  Does not confirm whether or not there is any actual flushing.  Onset several weeks.  No fevers, chills  Possible side effect of donepezil    Underweight (3 pound weight loss since last visit)  Importance of nutrition reviewed    Health maintenance  Vaccines recommended include:  High-dose influenza, COVID-19, and RSV         Review of Systems   Constitutional:  Negative for fatigue.   Respiratory:  Negative for cough and shortness of breath.    Cardiovascular:  Negative for chest pain, palpitations and leg swelling.   Gastrointestinal:  Negative for constipation and diarrhea.   Musculoskeletal:  Negative for gait problem.   Neurological:  Negative for dizziness, weakness, light-headedness and headaches.       Objective   /80 (BP Location: Right arm,  Patient Position: Sitting, BP Cuff Size: Adult)   Pulse 67   Wt (!) 44 kg (97 lb)   LMP  (LMP Unknown)   SpO2 97%   BMI 17.74 kg/m²     Physical Exam  Vitals reviewed.   HENT:      Head: Normocephalic.      Mouth/Throat:      Mouth: Mucous membranes are moist.   Eyes:      Conjunctiva/sclera: Conjunctivae normal.   Cardiovascular:      Rate and Rhythm: Normal rate and regular rhythm.   Pulmonary:      Effort: Pulmonary effort is normal.      Breath sounds: Normal breath sounds. No rales.   Abdominal:      General: Bowel sounds are normal.      Palpations: Abdomen is soft.      Tenderness: There is no abdominal tenderness.   Musculoskeletal:      Right lower leg: No edema.      Left lower leg: No edema.   Neurological:      Mental Status: She is alert.   Psychiatric:         Mood and Affect: Mood normal.         Assessment/Plan     Primary hypertension  Continue losartan 12.5 mg daily     Alzheimer's disease  Primary progressive aphasia  Continue memantine 10 mg twice a day and donepezil 5 mg dialy  Keep mind active with puzzles, reading, socializing  Continue routine follow-up with [facility] neurology, Dr. Curran (visit in 10/2024)     Patel's esophagus  Continue pantoprazole      History of lung cancer  Current lung nodule  Continue routine follow-up and CT chest surveillance per Dr. Barton ([facility] pulmonary medicine).     Macular degeneration  Continue routine follow-up with Dr. Smart, [facility] ophthalmology     History of recurrent skin cancer  Limit sun exposure  Continue routine follow-up with [facility] dermatology, Socorro Warren NP     Osteoporosis  Continue Prolia, next dose scheduled in 12/2024     Episodic sweating, possible hot flashes  (Possible effect of donepezil)  Monitor expectantly  Keep environment cool to prevent overheating  Discussed with neurology, Dr. Curran, at next visit    Underweight (3 pound weight loss since last visit)  Maintain 3 well-balanced meals daily  Recommend adding daily snack (such as  Ensure or Boost nutrition supplement or other nutritious snack)    Health maintenance  Vaccines recommended include:  High-dose influenza, COVID-19, and RSV    Follow-up  Office visit in January 2025    Isacc Roldan MD

## 2024-09-16 ASSESSMENT — ENCOUNTER SYMPTOMS
WEAKNESS: 0
HEADACHES: 0
SHORTNESS OF BREATH: 0
LIGHT-HEADEDNESS: 0
CONSTIPATION: 0
DIARRHEA: 0
DIZZINESS: 0
FATIGUE: 0
PALPITATIONS: 0
COUGH: 0

## 2024-12-09 ENCOUNTER — TELEPHONE (OUTPATIENT)
Dept: PRIMARY CARE | Facility: CLINIC | Age: 89
End: 2024-12-09

## 2024-12-09 ENCOUNTER — OFFICE VISIT (OUTPATIENT)
Dept: PRIMARY CARE | Facility: CLINIC | Age: 89
End: 2024-12-09
Payer: MEDICARE

## 2024-12-09 VITALS
DIASTOLIC BLOOD PRESSURE: 80 MMHG | HEART RATE: 75 BPM | WEIGHT: 97 LBS | BODY MASS INDEX: 17.74 KG/M2 | TEMPERATURE: 98.5 F | SYSTOLIC BLOOD PRESSURE: 140 MMHG | OXYGEN SATURATION: 100 %

## 2024-12-09 DIAGNOSIS — J20.9 ACUTE BRONCHITIS, UNSPECIFIED ORGANISM: Primary | ICD-10-CM

## 2024-12-09 PROCEDURE — 3077F SYST BP >= 140 MM HG: CPT | Performed by: INTERNAL MEDICINE

## 2024-12-09 PROCEDURE — 99213 OFFICE O/P EST LOW 20 MIN: CPT | Performed by: INTERNAL MEDICINE

## 2024-12-09 PROCEDURE — 3079F DIAST BP 80-89 MM HG: CPT | Performed by: INTERNAL MEDICINE

## 2024-12-09 PROCEDURE — 1159F MED LIST DOCD IN RCRD: CPT | Performed by: INTERNAL MEDICINE

## 2024-12-09 RX ORDER — DOXYCYCLINE 100 MG/1
100 CAPSULE ORAL 2 TIMES DAILY
Qty: 20 CAPSULE | Refills: 0 | Status: SHIPPED | OUTPATIENT
Start: 2024-12-09 | End: 2024-12-19

## 2024-12-09 NOTE — TELEPHONE ENCOUNTER
Taylor has been sick for 1 week.  Complains of head cold symptoms, cough, chest congestion.  No fever.  She would be able to come in this afternoon from 1-5:00 p.m. Please call Richie @ [phone].  Thank you

## 2024-12-09 NOTE — PATIENT INSTRUCTIONS
Acute bronchitis  Treat with antibiotic therapy: Doxycycline 100 mg twice a day for 10 days  Recommend over-the-counter medication for cough symptoms such as Mucinex DM or Robitussin DM  Continue to drink plenty of liquids    Hypertension  Continue losartan 12.5 mg daily  Bring blood pressure readings to next visit.    Follow-up  Office visit in January (as scheduled)  (Complete lab work prior to visit, orders are in place)

## 2024-12-09 NOTE — PROGRESS NOTES
Subjective   Patient ID: Taylor Blankenship is a 89 y.o. female who presents for URI (Cough, chest congestion, rhinitis ).    Same-day sick visit    Upper respiratory symptoms that began approximately 1 week ago.  Patient has had increasing cough with productive yellow sputum.  No fever, chills.    Hypertension  Current therapy is losartan 12.5 mg daily         Review of Systems   Constitutional:  Positive for fatigue. Negative for chills and fever.   HENT:  Positive for postnasal drip and rhinorrhea. Negative for ear pain and sore throat.    Respiratory:  Positive for cough. Negative for shortness of breath and wheezing.        Objective   /80 (BP Location: Right arm, Patient Position: Sitting)   Pulse 75   Temp 36.9 °C (98.5 °F)   Wt (!) 44 kg (97 lb)   LMP  (LMP Unknown)   SpO2 100%   BMI 17.74 kg/m²     Physical Exam  Vitals reviewed.   HENT:      Head: Normocephalic.      Right Ear: Tympanic membrane normal.      Left Ear: Tympanic membrane normal.      Mouth/Throat:      Mouth: Mucous membranes are moist.   Eyes:      Conjunctiva/sclera: Conjunctivae normal.   Cardiovascular:      Rate and Rhythm: Normal rate and regular rhythm.   Pulmonary:      Effort: Pulmonary effort is normal.      Breath sounds: Normal breath sounds. No wheezing.   Musculoskeletal:      Right lower leg: No edema.      Left lower leg: No edema.   Neurological:      Mental Status: She is alert.         Assessment/Plan     Acute bronchitis  Treat with antibiotic therapy: Doxycycline 100 mg twice a day for 10 days  Recommend over-the-counter medication for cough symptoms such as Mucinex DM or Robitussin DM  Continue to drink plenty of liquids    Hypertension  Continue losartan 12.5 mg daily  Bring blood pressure readings to next visit.    Follow-up  Office visit in January (as scheduled)  (Complete lab work prior to visit, orders are in place)    Isacc Roldan MD

## 2024-12-10 ENCOUNTER — APPOINTMENT (OUTPATIENT)
Dept: INFUSION THERAPY | Facility: CLINIC | Age: 89
End: 2024-12-10
Payer: MEDICARE

## 2024-12-12 ENCOUNTER — DOCUMENTATION (OUTPATIENT)
Dept: INFUSION THERAPY | Facility: CLINIC | Age: 89
End: 2024-12-12
Payer: MEDICARE

## 2024-12-12 NOTE — PROGRESS NOTES
"CLINICAL CLEARANCE FOR OUTPATIENT INJECTION      Patient to be scheduled for Continuation of Prolia injections.    For Diagnosis: Osteoporosis    Labs required prior to treatment (place lab orders if not already ordered or completed):  Calcium drawn on:   Lab Results   Component Value Date    CALCIUM 10.1 06/04/2024      Lab Results   Component Value Date    ALBUMIN 4.4 06/04/2024     No results found for: \"CAION\"   Calcium >8.6? YES BUT WILL NEED UPDATED LABS PRIOR TO NEXT INJECTION  (CA must be >8.6mg/dl or ionized calcium WNL.  Hold / Contact provider if <8.6) (must be within 28 days of scheduled injection)    Lab Results   Component Value Date    EGFR 69 06/04/2024        eGFR: 69  (Patients with a eGFR <30 are at increased risk of hypocalcemia)      *If eGFR less than 30 reach out to prescribing provider to discuss need for frequent lab monitoring and supplementation. Recommendation is to Monitor serum calcium weekly for the first month after initiation and then at least monthly thereafter due to increased risk for hypocalcemia.    Calcium and Vitamin D supplement on medication list? No    Current Outpatient Medications:     cyanocobalamin (Vitamin B-12) 1,000 mcg tablet, Take 1 tablet (1,000 mcg) by mouth once daily., Disp: , Rfl:     docusate sodium (Colace) 100 mg capsule, Take 1 capsule (100 mg) by mouth once daily. (Patient not taking: Reported on 12/9/2024), Disp: , Rfl:     donepezil (Aricept) 5 mg tablet, Take 1 tablet (5 mg) by mouth once daily at bedtime., Disp: , Rfl:     doxycycline (Vibramycin) 100 mg capsule, Take 1 capsule (100 mg) by mouth 2 times a day for 10 days. Take with at least 8 ounces (large glass) of water, do not lie down for 30 minutes after, Disp: 20 capsule, Rfl: 0    erythromycin (Romycin) 5 mg/gram (0.5 %) ophthalmic ointment, APPLY IN THE RIGHT EYE 2 TIMES DAILY, Disp: , Rfl:     losartan (Cozaar) 25 mg tablet, Take 0.5 tablets (12.5 mg) by mouth once daily., Disp: 30 tablet, " Rfl: 5    magnesium oxide 400 mg magnesium capsule, Take 1 capsule (400 mg) by mouth once daily., Disp: , Rfl:     memantine (Namenda) 10 mg tablet, Take 1 tablet (10 mg) by mouth 2 times a day., Disp: , Rfl:     mometasone (Elocon) 0.1 % cream, , Disp: , Rfl:     pantoprazole (ProtoNix) 40 mg EC tablet, Take 1 tablet (40 mg) by mouth once daily. Do not crush, chew, or split., Disp: 30 tablet, Rfl: 11    psyllium husk, aspartame, (Metamucil Sugar-Free, aspart,) 3.4 gram/5.8 gram powder, Take 1 tablespoon in 8 ounces liquid daily, Disp: , Rfl:     Current Facility-Administered Medications:     denosumab (Prolia) injection 60 mg, 60 mg, subcutaneous, q6 months, Isacc Roldan MD, 60 mg at 06/10/24 1515   (if no nurse to encourage discussion of supplementation at visit)    No obvious recent dental work per chart review. Nurse to confirm no dental within past/next 4 weeks at encounter.    Urine Hcg test ordered prior to first injection?Not applicable (If female pt <60 years of age and with reproductive capability)  (If not ordered and is indicated place order)    Last injection received: 6/10/24 (if continuation)    Due: 12/10/24    Ok to schedule for prolia; please instruct pt to have labs drawn no more than 28 days prior to their scheduled appointment

## 2024-12-14 ASSESSMENT — ENCOUNTER SYMPTOMS
CHILLS: 0
WHEEZING: 0
RHINORRHEA: 1
COUGH: 1
FEVER: 0
FATIGUE: 1
SHORTNESS OF BREATH: 0
SORE THROAT: 0

## 2024-12-16 ENCOUNTER — LAB (OUTPATIENT)
Dept: LAB | Facility: LAB | Age: 89
End: 2024-12-16
Payer: MEDICARE

## 2024-12-16 ENCOUNTER — APPOINTMENT (OUTPATIENT)
Dept: INFUSION THERAPY | Facility: CLINIC | Age: 89
End: 2024-12-16
Payer: MEDICARE

## 2024-12-16 DIAGNOSIS — M81.0 AGE-RELATED OSTEOPOROSIS WITHOUT CURRENT PATHOLOGICAL FRACTURE: ICD-10-CM

## 2024-12-16 LAB
ALBUMIN SERPL BCP-MCNC: 4.4 G/DL (ref 3.4–5)
ALP SERPL-CCNC: 67 U/L (ref 33–136)
ALT SERPL W P-5'-P-CCNC: 15 U/L (ref 7–45)
ANION GAP SERPL CALC-SCNC: 14 MMOL/L (ref 10–20)
AST SERPL W P-5'-P-CCNC: 19 U/L (ref 9–39)
BILIRUB SERPL-MCNC: 0.7 MG/DL (ref 0–1.2)
BUN SERPL-MCNC: 29 MG/DL (ref 6–23)
CA-I BLD-SCNC: 1.36 MMOL/L (ref 1.1–1.33)
CALCIUM SERPL-MCNC: 9.8 MG/DL (ref 8.6–10.6)
CHLORIDE SERPL-SCNC: 105 MMOL/L (ref 98–107)
CO2 SERPL-SCNC: 26 MMOL/L (ref 21–32)
CREAT SERPL-MCNC: 1.04 MG/DL (ref 0.5–1.05)
EGFRCR SERPLBLD CKD-EPI 2021: 52 ML/MIN/1.73M*2
GLUCOSE SERPL-MCNC: 101 MG/DL (ref 74–99)
POTASSIUM SERPL-SCNC: 4.7 MMOL/L (ref 3.5–5.3)
PROT SERPL-MCNC: 7 G/DL (ref 6.4–8.2)
SODIUM SERPL-SCNC: 140 MMOL/L (ref 136–145)

## 2024-12-16 PROCEDURE — 36415 COLL VENOUS BLD VENIPUNCTURE: CPT

## 2024-12-23 ENCOUNTER — APPOINTMENT (OUTPATIENT)
Dept: INFUSION THERAPY | Facility: CLINIC | Age: 89
End: 2024-12-23
Payer: MEDICARE

## 2024-12-23 VITALS
OXYGEN SATURATION: 97 % | SYSTOLIC BLOOD PRESSURE: 136 MMHG | WEIGHT: 98.55 LBS | DIASTOLIC BLOOD PRESSURE: 71 MMHG | TEMPERATURE: 97.3 F | RESPIRATION RATE: 16 BRPM | BODY MASS INDEX: 18.02 KG/M2 | HEART RATE: 83 BPM

## 2024-12-23 DIAGNOSIS — M81.0 AGE-RELATED OSTEOPOROSIS WITHOUT CURRENT PATHOLOGICAL FRACTURE: ICD-10-CM

## 2024-12-23 PROCEDURE — 96372 THER/PROPH/DIAG INJ SC/IM: CPT | Performed by: NURSE PRACTITIONER

## 2024-12-23 RX ORDER — EPINEPHRINE 0.3 MG/.3ML
0.3 INJECTION SUBCUTANEOUS EVERY 5 MIN PRN
OUTPATIENT
Start: 2025-06-05

## 2024-12-23 RX ORDER — DIPHENHYDRAMINE HYDROCHLORIDE 50 MG/ML
50 INJECTION INTRAMUSCULAR; INTRAVENOUS AS NEEDED
OUTPATIENT
Start: 2025-06-05

## 2024-12-23 RX ORDER — FAMOTIDINE 10 MG/ML
20 INJECTION INTRAVENOUS ONCE AS NEEDED
OUTPATIENT
Start: 2025-06-05

## 2024-12-23 RX ORDER — ALBUTEROL SULFATE 0.83 MG/ML
3 SOLUTION RESPIRATORY (INHALATION) AS NEEDED
OUTPATIENT
Start: 2025-06-05

## 2024-12-23 ASSESSMENT — ENCOUNTER SYMPTOMS
EXTREMITY WEAKNESS: 0
WHEEZING: 0
WOUND: 0
NUMBNESS: 0
COUGH: 0
LIGHT-HEADEDNESS: 0
PALPITATIONS: 0
LEG SWELLING: 0
DIZZINESS: 0
SHORTNESS OF BREATH: 0

## 2024-12-23 NOTE — PATIENT INSTRUCTIONS
Today :We administered denosumab.     For:   1. Age-related osteoporosis without current pathological fracture         Your next appointment is due in:  6 MONTHS        Please read the  Medication Guide that was given to you and reviewed during todays visit.     (Tell all doctors including dentists that you are taking this medication)     Go to the emergency room or call 911 if:  -You have signs of allergic reaction:   -Rash, hives, itching.   -Swollen, blistered, peeling skin.   -Swelling of face, lips, mouth, tongue or throat.   -Tightness of chest, trouble breathing, swallowing or talking     Call your doctor:  - If IV / injection site gets red, warm, swollen, itchy or leaks fluid or pus.     (Leave dressing on your IV site for at least 2 hours and keep area clean and dry  - If you get sick or have symptoms of infection or are not feeling well for any reason.    (Wash your hands often, stay away from people who are sick)  - If you have side effects from your medication that do not go away or are bothersome.     (Refer to the teaching your nurse gave you for side effects to call your doctor about)    - Common side effects may include:  stuffy nose, headache, feeling tired, muscle aches, upset stomach  - Before receiving any vaccines     - Call the [facility] at   If:  - You get sick, are on antibiotics, have had a recent vaccine, have surgery or dental work and your doctor wants your visit rescheduled.  - You need to cancel and reschedule your visit for any reason. Call at least 2 days before your visit if you need to cancel.   - Your insurance changes before your next visit.    (We will need to get approval from your new insurance. This can take up to two weeks.)     The [facility] is opened Monday thru Friday. We are closed on weekends and holidays.   Voice mail will take your call if the center is closed. If you leave a message please allow 24 hours for a call back during  weekdays. If you leave a message on a weekend/holiday, we will call you back the next business day.    A pharmacist is available Monday - Friday from 8:30AM to 3:30PM to help answer any questions you may have about your prescriptions(s). Please call pharmacy at:    [facility]: [phone]  [facility]: [phone]  [facility]: [phone]

## 2024-12-23 NOTE — PROGRESS NOTES
[facility]   Infusion Clinic Note   Date: 2024   Name: Alexandria Blankenship  : 1935   MRN: [id number]          Reason for Visit: No chief complaint on file.         Today: We administered denosumab.       Visit Type: INJECTION       Ordered By: IVÁN       Accompanied by:Self       Diagnosis: Age-related osteoporosis without current pathological fracture        Allergies:   Allergies as of 2024 - Reviewed 2024   Allergen Reaction Noted    Adhesive tape-silicones Rash 2018    Latex, natural rubber Itching and Rash 2016    Donepezil Dizziness 2024    Sutures Unknown 2018          Current Medications has a current medication list which includes the following prescription(s): cyanocobalamin, docusate sodium, donepezil, erythromycin, losartan, magnesium oxide, memantine, mometasone, pantoprazole, and metamucil sugar-free (aspart), and the following Facility-Administered Medications: denosumab.       Vitals:   Vitals:    24 1709   BP: 136/71   Pulse: 83   Resp: 16   Temp: 36.3 °C (97.3 °F)   SpO2: 97%   Weight: (!) 44.7 kg (98 lb 8.7 oz)           *PT HAS ALZHEIMER'S AND HAD DIFFICULTY ANSWERING THE QUESTIONS/RECALLING THINGS- MAY NOT BE COMPLETELY ACCURATE         Infusion Pre-procedure Checklist:   - Allergies reviewed: yes   - Medications reviewed: yes       - Previous reaction to current treatment: no      Assess patient for the concerns below. Document provider notification as appropriate.  - Active or recent infection with/without current antibiotic use: no  - Recent or planned invasive dental work: no  - Recent or planned surgeries: no  - Recently received or plans to receive vaccinations: NO  - Has treatment related toxicities: no  - Is pregnant:  n/a      Pain: 0   - Is the pain different from normal: n/a   - Is your Doctor aware:  n/a       Labs: N/A          Fall Risk Screening: Marline Fall Risk  History of Falling, Immediate  or Within 3 Months: No  Secondary Diagnosis: No  Ambulatory Aid: Walks without aid/bedrest/nurse assist  Intravenous Therapy/Heparin Lock: Yes  Gait/Transferring: Normal/bedrest/immobile  Mental Status: Forgets limitations  Horan Fall Risk Score: 35       Review Of Systems:  Review of Systems   Respiratory:  Negative for cough, shortness of breath and wheezing.    Cardiovascular:  Negative for chest pain, leg swelling and palpitations.   Skin:  Negative for itching, rash and wound.   Neurological:  Negative for dizziness, extremity weakness, light-headedness and numbness.         ROS completed? Yes      Infusion Readiness:  - Assessment Concerns Related to Infusion: No  - Provider notified: n/a      Document Below Only If Indicated:   New Patient Education:    N/A (returning patient for continuation of therapy. Ongoing education provided as needed.)        Treatment Conditions & Drug Specific Questions:    Denosumab  (PROLIA. XGEVA)    (Unless otherwise specified on patient specific therapy plan):     TREATMENT CONDITIONS:  Unless otherwise specified on patient specific therapy plan HOLD and notify provider prior to proceeding with today's injection if patients:  o Calcium is LESS THAN 8.6 mg/dL OR  Ionized Calcium LESS THAN 1.1 mmol/L  o Recent or planned invasive dental procedure (within 4 weeks)    Lab Results   Component Value Date    CALCIUM 9.8 12/16/2024      Lab Results   Component Value Date    CAION 1.36 (H) 12/16/2024       Labs reviewed and patient meets treatment conditions? Yes    DRUG SPECIFIC QUESTIONS:  Is the patient taking calcium and vitamin D? No - PT THINKS SHE TAKES VITAMIN D BUT ISN'T SURE- NOT ON MED LIST (PT HAS ALZHEIMER'S)  (Recommended)    Pt Instructed on following risks: (1) hypocalcemia, (2) osteonecrosis of the jaw, (3) atypical femoral fractures, (4) serious infections, and (5) dermatologic reactions?  Yes      REMINDER:  PREGNANCY CATEGORY X DRUG. OBTAIN NEGTATIVE PREGNANCY TEST  PRIOR TO FIRST INFUSION FOR WOMEN OF CHILDBEARING ABILITY   REMS DRUG    Recommended Vitals/Observation:  Vitals: Obtain vitals prior to injection.  Observation: Patient may leave immediately following injection.        Weight Based Drug Calculations:    WEIGHT BASED DRUGS: NOT APPLICABLE / FLAT DOSE          Initiated By: Lidia Moreno RN

## 2025-01-13 ENCOUNTER — APPOINTMENT (OUTPATIENT)
Dept: PRIMARY CARE | Facility: CLINIC | Age: 89
End: 2025-01-13
Payer: MEDICARE

## 2025-01-13 VITALS
OXYGEN SATURATION: 97 % | DIASTOLIC BLOOD PRESSURE: 60 MMHG | WEIGHT: 98 LBS | BODY MASS INDEX: 17.92 KG/M2 | HEART RATE: 60 BPM | SYSTOLIC BLOOD PRESSURE: 122 MMHG

## 2025-01-13 DIAGNOSIS — K22.70 BARRETT'S ESOPHAGUS WITHOUT DYSPLASIA: ICD-10-CM

## 2025-01-13 DIAGNOSIS — Z23 NEED FOR VACCINATION: ICD-10-CM

## 2025-01-13 DIAGNOSIS — I10 PRIMARY HYPERTENSION: Primary | ICD-10-CM

## 2025-01-13 DIAGNOSIS — Z85.118 HISTORY OF PRIMARY MALIGNANT NEOPLASM OF RIGHT LUNG: ICD-10-CM

## 2025-01-13 DIAGNOSIS — F02.80 PRIMARY PROGRESSIVE APHASIA (MULTI): ICD-10-CM

## 2025-01-13 DIAGNOSIS — G31.01 PRIMARY PROGRESSIVE APHASIA (MULTI): ICD-10-CM

## 2025-01-13 DIAGNOSIS — R91.1 NODULE OF RIGHT LUNG: ICD-10-CM

## 2025-01-13 DIAGNOSIS — M81.0 AGE-RELATED OSTEOPOROSIS WITHOUT CURRENT PATHOLOGICAL FRACTURE: ICD-10-CM

## 2025-01-13 DIAGNOSIS — F02.80 ALZHEIMER'S DISEASE: ICD-10-CM

## 2025-01-13 DIAGNOSIS — Z85.828 HISTORY OF SKIN CANCER: ICD-10-CM

## 2025-01-13 DIAGNOSIS — G30.9 ALZHEIMER'S DISEASE: ICD-10-CM

## 2025-01-13 PROCEDURE — 99214 OFFICE O/P EST MOD 30 MIN: CPT | Performed by: INTERNAL MEDICINE

## 2025-01-13 PROCEDURE — G0008 ADMIN INFLUENZA VIRUS VAC: HCPCS | Performed by: INTERNAL MEDICINE

## 2025-01-13 PROCEDURE — 3074F SYST BP LT 130 MM HG: CPT | Performed by: INTERNAL MEDICINE

## 2025-01-13 PROCEDURE — 3078F DIAST BP <80 MM HG: CPT | Performed by: INTERNAL MEDICINE

## 2025-01-13 PROCEDURE — 90662 IIV NO PRSV INCREASED AG IM: CPT | Performed by: INTERNAL MEDICINE

## 2025-01-13 NOTE — LETTER
January 15, 2025    Jared Barton MD  [facility]    Re:: Alexandria Blankenship ( 1935)    Dear Dr. Barton:    I hope that you are doing well.  I recently saw our mutual patient, Taylor, whom you have been following for history of lung cancer and right lung nodule surveillance.    I see that she had CT chest completed on 10/16/2024 which was notable for previously seen right lung nodule.  I did not find any office visit with you or recommendations for ongoing surveillance and follow-up.    I was hoping that you might contact the patient regarding follow-up and also update me if you are able.      Sincerely,        Isacc Roldan MD  [address], [place] [address]    Phone:  [phone]  Fax:  [phone]

## 2025-01-13 NOTE — PATIENT INSTRUCTIONS
Primary hypertension  Continue losartan 12.5 mg daily     Alzheimer's disease  Primary progressive aphasia  Continue donepezil 5 mg daily and memantine 10 mg twice a day  Keep mind active with puzzles, reading, socializing   Bina is due in April.       Patel's esophagus  Continue pantoprazole 40 mg daily      History of lung cancer  Current lung nodule (last CT chest at [facility] on 10/16/2024)  Continue routine follow-up with [facility] pulmonary medicine, Dr. Barton.  Please contact my office with recommendations regarding ongoing lung surveillance (i.e. when his next CT chest to be done?)     Macular degeneration  Continue routine follow-up with Dr. Smart, [facility] ophthalmology     History of recurrent skin cancer  Limit sun exposure  Continue routine follow-up with dermatology      Osteoporosis  Continue Prolia injections every 6 months, next due in 6/2025    Health maintenance  High-dose influenza vaccine given today.  RSV vaccine is recommended (to be received at pharmacy).    Follow-up  Wellness exam/physical on 5/21/2025  (Fasting lab work to be ordered to complete 3 to 7 days prior, orders will be in place)

## 2025-01-17 DIAGNOSIS — Z00.00 WELLNESS EXAMINATION: Primary | ICD-10-CM

## 2025-01-17 ASSESSMENT — ENCOUNTER SYMPTOMS
SHORTNESS OF BREATH: 0
DYSURIA: 0
PALPITATIONS: 0
FATIGUE: 0
DIZZINESS: 0
CONSTIPATION: 0
ABDOMINAL PAIN: 0
DIARRHEA: 0
HEADACHES: 0
BACK PAIN: 0
ARTHRALGIAS: 0
COUGH: 0
FREQUENCY: 0

## 2025-03-01 DIAGNOSIS — I10 PRIMARY HYPERTENSION: ICD-10-CM

## 2025-03-01 RX ORDER — LOSARTAN POTASSIUM 25 MG/1
TABLET ORAL DAILY
Qty: 45 TABLET | Refills: 3 | Status: SHIPPED | OUTPATIENT
Start: 2025-03-01

## 2025-03-23 DIAGNOSIS — M81.0 AGE-RELATED OSTEOPOROSIS WITHOUT CURRENT PATHOLOGICAL FRACTURE: Primary | ICD-10-CM

## 2025-05-13 NOTE — PROGRESS NOTES
Subjective   Patient ID: Taylor Blankenship is a 89 y.o. female who presents for Follow-up.    Routine office visit  (Patient here without family or caregiver)    Primary hypertension  Blood pressure is stable, currently on low-dose losartan 12.5 mg daily     Alzheimer's disease  Primary progressive aphasia  Current therapy with donepezil 5 mg daily and memantine 10 mg twice a day  She tries to keep active and mind engaged.  Family is very supportive.  She does live independently at [facility].  Assistance of caregivers.   Dr. Curran is due in April.       Patel's esophagus  Patient denies any dysphagia or breakthrough heartburn, remains on pantoprazole 40 mg daily  GI consult Dr. Page, no plan for surveillance EGDs     History of lung cancer  Current lung nodule  Most recent CT chest at [facility] completed in 10/2024, managed by pulmonary medicine Dr. Barton     Macular degeneration  Routine follow-up with Dr. Smart, [facility] ophthalmology     History of recurrent skin cancer  Dermatology follow-up     Osteoporosis  Prolia injections every 6 months, next due in 6/2025    Health maintenance  High-dose influenza vaccine given today.  RSV vaccine is recommended         Review of Systems   Constitutional:  Negative for fatigue.   Respiratory:  Negative for cough and shortness of breath.    Cardiovascular:  Negative for chest pain and palpitations.   Gastrointestinal:  Negative for abdominal pain, constipation and diarrhea.   Genitourinary:  Negative for dysuria and frequency.   Musculoskeletal:  Negative for arthralgias and back pain.   Neurological:  Negative for dizziness and headaches.       Objective   /60 (BP Location: Left arm, Patient Position: Sitting, BP Cuff Size: Adult)   Pulse 60   Wt (!) 44.5 kg (98 lb)   LMP  (LMP Unknown)   SpO2 97%   BMI 17.92 kg/m²     Physical Exam  Vitals reviewed.   Constitutional:       Appearance: Normal appearance.   HENT:      Head: Normocephalic.      Right Ear:  Yes sent yesterday to [facility]   Tympanic membrane normal.      Left Ear: Tympanic membrane normal.      Mouth/Throat:      Mouth: Mucous membranes are moist.      Pharynx: No oropharyngeal exudate or posterior oropharyngeal erythema.   Eyes:      Conjunctiva/sclera: Conjunctivae normal.   Neck:      Vascular: No carotid bruit.   Cardiovascular:      Rate and Rhythm: Normal rate and regular rhythm.      Heart sounds: No murmur heard.  Pulmonary:      Effort: Pulmonary effort is normal.      Breath sounds: Normal breath sounds. No wheezing or rales.   Abdominal:      General: Bowel sounds are normal.      Palpations: Abdomen is soft.      Tenderness: There is no abdominal tenderness.   Musculoskeletal:      Right lower leg: No edema.      Left lower leg: No edema.   Lymphadenopathy:      Cervical: No cervical adenopathy.   Skin:     General: Skin is warm.      Findings: No rash.   Neurological:      General: No focal deficit present.      Mental Status: She is alert.      Comments: Baseline expressive aphasia/dysphagia  Motor exam: 5/5 in all 4 extremities   Psychiatric:         Mood and Affect: Mood normal.         Assessment/Plan     Primary hypertension  Continue losartan 12.5 mg daily     Alzheimer's disease  Primary progressive aphasia  Continue donepezil 5 mg daily and memantine 10 mg twice a day  Keep mind active with puzzles, reading, socializing   Bina is due in April.       Patel's esophagus  Continue pantoprazole 40 mg daily      History of lung cancer  Current lung nodule (last CT chest at [facility] on 10/16/2024)  Continue routine follow-up with [facility] pulmonary medicine, Dr. Barton.  Please contact his office with recommendations regarding ongoing lung surveillance (i.e. when is next CT chest to be done?)     Macular degeneration  Continue routine follow-up with Dr. Smart, [facility] ophthalmology     History of recurrent skin cancer  Limit sun exposure  Continue routine follow-up with dermatology      Osteoporosis  Continue Prolia injections  every 6 months, next due in 6/2025    Health maintenance  High-dose influenza vaccine given today.  RSV vaccine is recommended (to be received at pharmacy).    Follow-up  Wellness exam/physical on 5/21/2025  (Fasting lab work to be ordered to complete 3 to 7 days prior, orders will be in place)    Isacc Roldan MD

## 2025-05-21 ENCOUNTER — APPOINTMENT (OUTPATIENT)
Dept: PRIMARY CARE | Facility: CLINIC | Age: 89
End: 2025-05-21
Payer: MEDICARE

## 2025-06-11 ENCOUNTER — PATIENT MESSAGE (OUTPATIENT)
Dept: PRIMARY CARE | Facility: CLINIC | Age: 89
End: 2025-06-11
Payer: MEDICARE

## 2025-06-12 ENCOUNTER — APPOINTMENT (OUTPATIENT)
Dept: PRIMARY CARE | Facility: CLINIC | Age: 89
End: 2025-06-12
Payer: MEDICARE

## 2025-06-12 VITALS
HEART RATE: 63 BPM | SYSTOLIC BLOOD PRESSURE: 110 MMHG | BODY MASS INDEX: 18.03 KG/M2 | OXYGEN SATURATION: 97 % | HEIGHT: 62 IN | WEIGHT: 98 LBS | DIASTOLIC BLOOD PRESSURE: 60 MMHG

## 2025-06-12 DIAGNOSIS — K22.70 BARRETT'S ESOPHAGUS WITHOUT DYSPLASIA: ICD-10-CM

## 2025-06-12 DIAGNOSIS — Z12.31 BREAST CANCER SCREENING BY MAMMOGRAM: ICD-10-CM

## 2025-06-12 DIAGNOSIS — G30.9 ALZHEIMER'S DISEASE: ICD-10-CM

## 2025-06-12 DIAGNOSIS — I10 PRIMARY HYPERTENSION: ICD-10-CM

## 2025-06-12 DIAGNOSIS — F02.80 ALZHEIMER'S DISEASE: ICD-10-CM

## 2025-06-12 DIAGNOSIS — M81.0 AGE-RELATED OSTEOPOROSIS WITHOUT CURRENT PATHOLOGICAL FRACTURE: ICD-10-CM

## 2025-06-12 DIAGNOSIS — Z00.00 MEDICARE ANNUAL WELLNESS VISIT, SUBSEQUENT: Primary | ICD-10-CM

## 2025-06-12 DIAGNOSIS — C34.12: ICD-10-CM

## 2025-06-12 DIAGNOSIS — Z85.118 HISTORY OF PRIMARY MALIGNANT NEOPLASM OF RIGHT LUNG: ICD-10-CM

## 2025-06-12 DIAGNOSIS — F02.80 PRIMARY PROGRESSIVE APHASIA (MULTI): ICD-10-CM

## 2025-06-12 DIAGNOSIS — R91.1 NODULE OF RIGHT LUNG: ICD-10-CM

## 2025-06-12 DIAGNOSIS — E83.52 HYPERCALCEMIA: ICD-10-CM

## 2025-06-12 DIAGNOSIS — H35.3130 BILATERAL NONEXUDATIVE AGE-RELATED MACULAR DEGENERATION, UNSPECIFIED STAGE: ICD-10-CM

## 2025-06-12 DIAGNOSIS — G31.01 PRIMARY PROGRESSIVE APHASIA (MULTI): ICD-10-CM

## 2025-06-12 DIAGNOSIS — Z85.828 HISTORY OF SKIN CANCER: ICD-10-CM

## 2025-06-12 DIAGNOSIS — K59.09 INTERMITTENT CONSTIPATION: ICD-10-CM

## 2025-06-12 DIAGNOSIS — Z00.00 WELLNESS EXAMINATION: ICD-10-CM

## 2025-06-12 PROCEDURE — 3078F DIAST BP <80 MM HG: CPT | Performed by: INTERNAL MEDICINE

## 2025-06-12 PROCEDURE — 1159F MED LIST DOCD IN RCRD: CPT | Performed by: INTERNAL MEDICINE

## 2025-06-12 PROCEDURE — G0439 PPPS, SUBSEQ VISIT: HCPCS | Performed by: INTERNAL MEDICINE

## 2025-06-12 PROCEDURE — 3074F SYST BP LT 130 MM HG: CPT | Performed by: INTERNAL MEDICINE

## 2025-06-12 PROCEDURE — UHSPHYS PR UH SELECT PHYSICAL: Performed by: INTERNAL MEDICINE

## 2025-06-12 NOTE — PROGRESS NOTES
Alexandria Blankenship is a 89 y.o. year old here for a Medicare Annual Wellness Visit     Health Risk Assessment  In general, health is:  Good     Concerns with balance:  No     Concerns with teeth or dentures:  No     Concerns with sexual function:  No     Felt anxious, stressed, angry, irritable, lonely, isolated, or had thoughts of hurting themself:  No     Has little interest or pleasure in doing things:  No     Bothered by feeling down, depressed, or hopeless:  No     Needs help with grocery shopping, cooking, housework, bathing, grooming, dressing, eating, sitting or standing, walking, using the toilet, handling finances, taking medications, using the telephone, or driving:  Yes     Following safety precautions in the home environment and vehicle: removed throw rugs from floors, installed grab bars in the bathroom, handrails in stairwells, having adequate lighting, wearing seatbelt at all times?:  Yes     Smokes cigarettes, vapes, or chew tobacco:  No     Eats healthy foods including fruits, vegetables, whole grains, and fiber-rich foods:  Yes     Number of days per week engages in exercise:  None formally, walks     Average alcohol consumption: None        Current Providers  Specialists: I have reviewed specialist-related care of the patient in the medical record.     Medical/Family history review  Reviewed and updated problem list, medical/surgical/family/social history, medications, and allergies.     Opioid use review  Patient use of opioids:  No           Depression screening  Depression Screening PHQ-2 Score   2/14/2023 0         Depression screening tool completed and reviewed. Based on score and interview, patient is not at risk for depression. Screening tool discussed with patient, and I recommended no further intervention at this time.     Cognitive screening  Mini Cog Score:  Dementia on treatment     Cognitive screening reviewed and plan:  Yes     Functional Observation  Was the patient's timed Up &  "Go test unsteady or >= 12 seconds?  No     Advance Care Planning  End of Life planning discussed, including patient's advanced directive wishes:  Yes    Vision and Hearing assessment  Visual acuity (required for Welcome to Medicare):  Ophthalmology  Hearing Evaluation:  Normal    ====================================================    /60 (BP Location: Left arm, Patient Position: Sitting, BP Cuff Size: Adult)   Pulse 63   Ht 1.575 m (5' 2\")   Wt (!) 44.5 kg (98 lb)   LMP  (LMP Unknown)   SpO2 97%   BMI 17.92 kg/m²     Chief Complaint   Patient presents with    Annual Exam       Wellness exam/physical ([facility])    Primary hypertension  Blood pressure today is normal.  Patient is prescribed losartan 12.5 mg daily.     Alzheimer's disease  Primary progressive aphasia  During visit today, patient appears stable.  Ongoing expressive aphasia.  Speech therapy recommended and ordered by [facility] neurology at last visit 3 months ago, though referral misplaced according to jane.  New referral has been provided  Therapy includes donepezil 5 mg daily and memantine 10 mg twice a day    Underweight (stable, BMI 18)  Weight is stable.  Appetite is adequate per patient report.  Importance of adequate nutrition discussed.     Patel's esophagus  No breakthrough symptoms.  It is unclear whether or not patient is taking pantoprazole (she brought in medication bottles, though this medicine not one of the bottles)      History of lung cancer  Lung nodules   No coughing, wheezing, shortness of breath.  CT chest surveillance per [facility] pulmonary medicine.  Most recent CT chest in May 2025 is stable     Macular degeneration  Care with Dr. Smart, [facility] ophthalmology     History of recurrent skin cancer  Dermatology is monitoring     Osteoporosis  No formal exercise, though she does remain active.  Clarification regarding whether she is taking calcium or vitamin D needs to be made.  Prolia injections every 6 months, next due in " 6/2025    She resides at [facility].  She likes her home.  Good support from family.        Review of Systems   Constitutional: Negative for malaise/fatigue.   HENT:  Negative for hearing loss and sore throat.    Eyes:  Negative for blurred vision and visual disturbance.   Cardiovascular:  Negative for chest pain, dyspnea on exertion and palpitations.   Respiratory:  Negative for cough, shortness of breath and wheezing.    Skin:  Negative for rash.   Musculoskeletal:  Negative for back pain, joint pain and muscle cramps.   Gastrointestinal:  Negative for abdominal pain, constipation, diarrhea and heartburn.   Genitourinary:  Negative for dysuria, frequency and urgency.   Neurological:  Negative for dizziness, headaches, light-headedness and numbness.   Psychiatric/Behavioral:  Negative for depression. The patient is not nervous/anxious.         Physical Exam  Vitals reviewed.   Constitutional:       Appearance: Normal appearance.   HENT:      Head: Normocephalic.      Right Ear: Tympanic membrane normal.      Left Ear: Tympanic membrane normal.      Mouth/Throat:      Mouth: Mucous membranes are moist.      Pharynx: No oropharyngeal exudate or posterior oropharyngeal erythema.   Eyes:      Extraocular Movements: Extraocular movements intact.      Comments: Normal left eye.  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   Cardiovascular:      Rate and Rhythm: Normal rate and regular rhythm.      Heart sounds: No murmur heard.  Pulmonary:      Effort: Pulmonary effort is normal.      Breath sounds: Normal breath sounds. No wheezing or rales.   Abdominal:      General: Bowel sounds are normal.      Palpations: Abdomen is soft.      Tenderness: There is no abdominal tenderness.   Genitourinary:     Comments: Breast exam: Normal nipples, no masses or axillary lymphadenopathy bilaterally.    Musculoskeletal:      Right lower leg:  No edema.      Left lower leg: No edema.   Lymphadenopathy:      Cervical: No cervical adenopathy.   Skin:     General: Skin is warm.      Findings: No rash.   Neurological:      General: No focal deficit present.      Mental Status: She is alert and oriented to person, place, and time.   Psychiatric:         Mood and Affect: Mood normal.       Assessment and Plan    Medicare annual wellness visit (subsequent)  Complete fasting lab work in the next 1-2 weeks  Continuing healthy diet rich in fruits, vegetables, fiber, lean protein recommended  Continuing to keep active with walking and other exercise is encouraged.  Seeking exercise classes at [place] is recommended.  Screening mammogram ordered which should be scheduled  Routine appointment with dentist is recommended (patient states not seen in over 1 year).  Annual influenza vaccine is recommended in September    ============================    Wellness exam/physical ( [facility])  Complete fasting lab work in the next 1 to 2 weeks  Continuing healthy diet rich in fruits, vegetables, fiber, lean protein recommended  Continuing to keep active with walking and other exercise is encouraged.  Seeking exercise classes at [place] is recommended.  Screening mammogram ordered which should be scheduled  Routine appointment with dentist is recommended (patient states not seen in over 1 year).  Annual influenza vaccine is recommended in September    Primary hypertension  Continue losartan 12.5 mg daily     Alzheimer's disease  Primary progressive aphasia  Continue donepezil 5 mg daily and memantine 10 mg twice a day  Keep mind active with puzzles, reading, socializing  Referral for speech therapy has been provided (as recommended by neurology)  Continue routine follow-up with [facility] neurology for memory    Underweight (stable, BMI 18)  Recommend adequate nutrition, eating 3 full meals every day  Recommend daily nutrition support drink/protein smoothie     Patel's  esophagus  Continue pantoprazole 40 mg daily      History of lung cancer  Lung nodules (stable on most recent CT chest completed in 5/2025)  Continue routine follow-up with [facility] pulmonary medicine, Dr. Barton.     Macular degeneration  Continue routine follow-up with Dr. Smart, [facility] ophthalmology     History of recurrent skin cancer  Limit sun exposure  Continue routine follow-up with dermatology      Osteoporosis  Continue Prolia injections every 6 months, next due in 6/2025    Follow-up  1.  Office visit in November 2.  Wellness exam/Medicare annual wellness visit in 1 year    Isacc Roldan MD

## 2025-06-12 NOTE — PATIENT INSTRUCTIONS
Wellness exam/physical ([facility])  Complete fasting lab work in the next 1-2 weeks  Continuing healthy diet rich in fruits, vegetables, fiber, lean protein recommended  Continuing to keep active with walking and other exercise is encouraged.  Seeking exercise classes at [place] is recommended.  Screening mammogram ordered which should be scheduled  Routine appointment with dentist is recommended (patient states not seen in over 1 year).  Annual influenza vaccine is recommended in September    Primary hypertension  Continue losartan 12.5 mg daily     Alzheimer's disease  Primary progressive aphasia  Continue donepezil 5 mg daily and memantine 10 mg twice a day  Keep mind active with puzzles, reading, socializing  Referral for speech therapy has been provided (as recommended by neurology)  Continue routine follow-up with [facility] neurology for memory    Underweight (stable, BMI 18)  Recommend adequate nutrition, eating 3 full meals every day  Recommend daily nutrition support drink/protein smoothie     Patel's esophagus  Continue pantoprazole 40 mg daily      History of lung cancer  Lung nodules (stable on most recent CT chest completed in 5/2025)  Continue routine follow-up with [facility] pulmonary medicine, Dr. Barton.     Macular degeneration  Continue routine follow-up with Dr. Smart, [facility] ophthalmology     History of recurrent skin cancer  Limit sun exposure  Continue routine follow-up with dermatology      Osteoporosis  Continue Prolia injections every 6 months, next due in 6/2025    Follow-up  1.  Office visit in November 2.  Wellness exam/Medicare annual wellness visit in 1 year

## 2025-06-13 ASSESSMENT — ENCOUNTER SYMPTOMS
NERVOUS/ANXIOUS: 0
BACK PAIN: 0
COUGH: 0
DIZZINESS: 0
DYSURIA: 0
WHEEZING: 0
BLURRED VISION: 0
FREQUENCY: 0
ABDOMINAL PAIN: 0
DYSPNEA ON EXERTION: 0
CONSTIPATION: 0
MUSCLE CRAMPS: 0
LIGHT-HEADEDNESS: 0
HEADACHES: 0
SORE THROAT: 0
PALPITATIONS: 0
SHORTNESS OF BREATH: 0
HEARTBURN: 0
NUMBNESS: 0
DIARRHEA: 0
DEPRESSION: 0

## 2025-06-18 ENCOUNTER — LAB (OUTPATIENT)
Dept: LAB | Facility: HOSPITAL | Age: 89
End: 2025-06-18
Payer: MEDICARE

## 2025-06-18 DIAGNOSIS — Z00.00 ENCOUNTER FOR GENERAL ADULT MEDICAL EXAMINATION WITHOUT ABNORMAL FINDINGS: Primary | ICD-10-CM

## 2025-06-18 LAB
25(OH)D3 SERPL-MCNC: 32 NG/ML (ref 30–100)
ALBUMIN SERPL BCP-MCNC: 4.7 G/DL (ref 3.4–5)
ALP SERPL-CCNC: 63 U/L (ref 33–136)
ALT SERPL W P-5'-P-CCNC: 13 U/L (ref 7–45)
ANION GAP SERPL CALC-SCNC: 12 MMOL/L (ref 10–20)
APPEARANCE UR: CLEAR
AST SERPL W P-5'-P-CCNC: 19 U/L (ref 9–39)
BACTERIA #/AREA URNS AUTO: ABNORMAL /HPF
BASOPHILS # BLD AUTO: 0.06 X10*3/UL (ref 0–0.1)
BASOPHILS NFR BLD AUTO: 0.7 %
BILIRUB SERPL-MCNC: 0.5 MG/DL (ref 0–1.2)
BILIRUB UR STRIP.AUTO-MCNC: NEGATIVE MG/DL
BUN SERPL-MCNC: 24 MG/DL (ref 6–23)
CALCIUM SERPL-MCNC: 10.3 MG/DL (ref 8.6–10.6)
CHLORIDE SERPL-SCNC: 105 MMOL/L (ref 98–107)
CHOLEST SERPL-MCNC: 236 MG/DL (ref 0–199)
CHOLESTEROL/HDL RATIO: 3.3
CO2 SERPL-SCNC: 28 MMOL/L (ref 21–32)
COLOR UR: ABNORMAL
CREAT SERPL-MCNC: 0.87 MG/DL (ref 0.5–1.05)
CRP SERPL HS-MCNC: 0.7 MG/L
EGFRCR SERPLBLD CKD-EPI 2021: 64 ML/MIN/1.73M*2
EOSINOPHIL # BLD AUTO: 0.22 X10*3/UL (ref 0–0.4)
EOSINOPHIL NFR BLD AUTO: 2.5 %
ERYTHROCYTE [DISTWIDTH] IN BLOOD BY AUTOMATED COUNT: 14.7 % (ref 11.5–14.5)
EST. AVERAGE GLUCOSE BLD GHB EST-MCNC: 114 MG/DL
GLUCOSE SERPL-MCNC: 92 MG/DL (ref 74–99)
GLUCOSE UR STRIP.AUTO-MCNC: NORMAL MG/DL
HBA1C MFR BLD: 5.6 % (ref ?–5.7)
HCT VFR BLD AUTO: 47.8 % (ref 36–46)
HDLC SERPL-MCNC: 71 MG/DL
HGB BLD-MCNC: 14 G/DL (ref 12–16)
IMM GRANULOCYTES # BLD AUTO: 0.02 X10*3/UL (ref 0–0.5)
IMM GRANULOCYTES NFR BLD AUTO: 0.2 % (ref 0–0.9)
KETONES UR STRIP.AUTO-MCNC: NEGATIVE MG/DL
LDLC SERPL CALC-MCNC: 142 MG/DL
LEUKOCYTE ESTERASE UR QL STRIP.AUTO: ABNORMAL
LYMPHOCYTES # BLD AUTO: 1.19 X10*3/UL (ref 0.8–3)
LYMPHOCYTES NFR BLD AUTO: 13.7 %
MCH RBC QN AUTO: 25.5 PG (ref 26–34)
MCHC RBC AUTO-ENTMCNC: 29.3 G/DL (ref 32–36)
MCV RBC AUTO: 87 FL (ref 80–100)
MONOCYTES # BLD AUTO: 0.57 X10*3/UL (ref 0.05–0.8)
MONOCYTES NFR BLD AUTO: 6.6 %
MUCOUS THREADS #/AREA URNS AUTO: ABNORMAL /LPF
NEUTROPHILS # BLD AUTO: 6.64 X10*3/UL (ref 1.6–5.5)
NEUTROPHILS NFR BLD AUTO: 76.3 %
NITRITE UR QL STRIP.AUTO: NEGATIVE
NON HDL CHOLESTEROL: 165 MG/DL (ref 0–149)
NRBC BLD-RTO: 0 /100 WBCS (ref 0–0)
PH UR STRIP.AUTO: 5.5 [PH]
PLATELET # BLD AUTO: 258 X10*3/UL (ref 150–450)
POTASSIUM SERPL-SCNC: 4.3 MMOL/L (ref 3.5–5.3)
PROT SERPL-MCNC: 7.3 G/DL (ref 6.4–8.2)
PROT UR STRIP.AUTO-MCNC: NEGATIVE MG/DL
RBC # BLD AUTO: 5.48 X10*6/UL (ref 4–5.2)
RBC # UR STRIP.AUTO: ABNORMAL MG/DL
RBC #/AREA URNS AUTO: ABNORMAL /HPF
SODIUM SERPL-SCNC: 141 MMOL/L (ref 136–145)
SP GR UR STRIP.AUTO: 1.01
SQUAMOUS #/AREA URNS AUTO: ABNORMAL /HPF
TRANS CELLS #/AREA UR COMP ASSIST: ABNORMAL /HPF
TRIGL SERPL-MCNC: 116 MG/DL (ref 0–149)
TSH SERPL-ACNC: 1.19 MIU/L (ref 0.44–3.98)
UROBILINOGEN UR STRIP.AUTO-MCNC: NORMAL MG/DL
VLDL: 23 MG/DL (ref 0–40)
WBC # BLD AUTO: 8.7 X10*3/UL (ref 4.4–11.3)
WBC #/AREA URNS AUTO: ABNORMAL /HPF

## 2025-06-18 PROCEDURE — 82306 VITAMIN D 25 HYDROXY: CPT

## 2025-06-18 PROCEDURE — 84443 ASSAY THYROID STIM HORMONE: CPT

## 2025-06-18 PROCEDURE — 80061 LIPID PANEL: CPT

## 2025-06-18 PROCEDURE — 86141 C-REACTIVE PROTEIN HS: CPT

## 2025-06-18 PROCEDURE — 83036 HEMOGLOBIN GLYCOSYLATED A1C: CPT

## 2025-06-18 PROCEDURE — 80053 COMPREHEN METABOLIC PANEL: CPT

## 2025-06-18 PROCEDURE — 85025 COMPLETE CBC W/AUTO DIFF WBC: CPT

## 2025-06-18 PROCEDURE — 81001 URINALYSIS AUTO W/SCOPE: CPT

## 2025-06-18 PROCEDURE — 36415 COLL VENOUS BLD VENIPUNCTURE: CPT

## 2025-06-19 LAB
HOLD SPECIMEN: NORMAL
PTH-INTACT SERPL-MCNC: 91 PG/ML (ref 16–77)

## 2025-06-23 ENCOUNTER — APPOINTMENT (OUTPATIENT)
Dept: INFUSION THERAPY | Facility: CLINIC | Age: 89
End: 2025-06-23
Payer: MEDICARE

## 2025-06-23 VITALS
SYSTOLIC BLOOD PRESSURE: 136 MMHG | RESPIRATION RATE: 16 BRPM | OXYGEN SATURATION: 96 % | HEART RATE: 76 BPM | DIASTOLIC BLOOD PRESSURE: 80 MMHG | TEMPERATURE: 97.6 F

## 2025-06-23 DIAGNOSIS — M81.0 AGE-RELATED OSTEOPOROSIS WITHOUT CURRENT PATHOLOGICAL FRACTURE: ICD-10-CM

## 2025-06-23 PROCEDURE — 96372 THER/PROPH/DIAG INJ SC/IM: CPT | Performed by: NURSE PRACTITIONER

## 2025-06-23 RX ORDER — FAMOTIDINE 10 MG/ML
20 INJECTION, SOLUTION INTRAVENOUS ONCE AS NEEDED
OUTPATIENT
Start: 2025-12-11

## 2025-06-23 RX ORDER — ALBUTEROL SULFATE 0.83 MG/ML
3 SOLUTION RESPIRATORY (INHALATION) AS NEEDED
OUTPATIENT
Start: 2025-12-11

## 2025-06-23 RX ORDER — DIPHENHYDRAMINE HYDROCHLORIDE 50 MG/ML
50 INJECTION, SOLUTION INTRAMUSCULAR; INTRAVENOUS AS NEEDED
OUTPATIENT
Start: 2025-12-11

## 2025-06-23 RX ORDER — EPINEPHRINE 0.3 MG/.3ML
0.3 INJECTION SUBCUTANEOUS EVERY 5 MIN PRN
OUTPATIENT
Start: 2025-12-11

## 2025-06-23 ASSESSMENT — ENCOUNTER SYMPTOMS
DIZZINESS: 0
NUMBNESS: 0
LIGHT-HEADEDNESS: 0
SHORTNESS OF BREATH: 0
LEG SWELLING: 0
EXTREMITY WEAKNESS: 0
WOUND: 0
PALPITATIONS: 0
WHEEZING: 0
COUGH: 0

## 2025-06-23 NOTE — PROGRESS NOTES
[facility]   Infusion Clinic Note   Date: 2025   Name: Alexandria Blankenship  : 1935   MRN: [id number]         Reason for Visit: Injections and Injection Follow-up (Every 6 months Prolia 60mg subcutaneous injection)         Today: We administered denosumab.       Ordered By: Isacc Roldan MD       For a Diagnosis of: Age-related osteoporosis without current pathological fracture       At today's visit patient accompanied by: Aid, stayed in waiting room      Today's Vitals:   Vitals:    25 1321   BP: 136/80   Pulse: 76   Resp: 16   Temp: 36.4 °C (97.6 °F)   TempSrc: Temporal   SpO2: 96%             Pre - Treatment Checklist:      - Previous reaction to current treatment: no      (Assess patient for the concerns below. Document provider notification as appropriate).  - Active or recent infection with/without current antibiotic use: no  - Recent or planned invasive dental work: no  - Recent or planned surgeries: no  - Recently received or plans to receive vaccinations: no  - Has treatment related toxicities: no  - Any chance may be pregnant:  no      Pain: 0   - Is the pain different from normal: n/a   - Is prescribing Doctor aware:  n/a      Labs: Reviewed       Fall Risk Screening: Horan Fall Risk  History of Falling, Immediate or Within 3 Months: No  Secondary Diagnosis: Yes  Ambulatory Aid: Walks without aid/bedrest/nurse assist  Intravenous Therapy/Heparin Lock: No  Gait/Transferring: Normal/bedrest/immobile  Mental Status: Forgets limitations  Horan Fall Risk Score: 30       Review Of Systems:  Review of Systems   Respiratory:  Negative for cough, shortness of breath and wheezing.    Cardiovascular:  Negative for chest pain, leg swelling and palpitations.   Skin:  Negative for itching, rash and wound.   Neurological:  Negative for dizziness, extremity weakness, light-headedness and numbness.   Psychiatric/Behavioral:          H/O Alzheimers/Dementia         Infusion  Readiness:  - Assessment Concerns Related to Infusion: No  - Provider notified: n/a      New Patient Education:    N/A (returning patient for continuation of therapy. Ongoing education provided as needed.)        Treatment Conditions & Drug Specific Questions:    Denosumab  (PROLIA. XGEVA. STOBOCLO)    (Unless otherwise specified on patient specific therapy plan):     TREATMENT CONDITIONS:  Unless otherwise specified on patient specific therapy plan HOLD and notify provider prior to proceeding with today's injection if patients:  O Corrected or Serum Calcium LESS THAN 8.6 mg/dL  OR Ionized calcium less than 1.1 mmol/L or  less than 4.7 mg/dL (depending on resulting agency)  o Recent or planned invasive dental procedure (within 4 weeks)    Lab Results   Component Value Date    CALCIUM 10.3 06/18/2025      Lab Results   Component Value Date    CAION 1.36 (H) 12/16/2024     Patient meets treatment conditions? Yes    DRUG SPECIFIC QUESTIONS:  Is the patient taking calcium and vitamin D? No, encouraged to talk to provider about starting supplementation  (Recommended)    Pt Instructed on following risks: (1) hypocalcemia, (2) osteonecrosis of the jaw, (3) atypical femoral fractures, (4) serious infections, and (5) dermatologic reactions?  Yes      REMINDER:  PREGNANCY CATEGORY X DRUG. OBTAIN NEGTATIVE PREGNANCY TEST PRIOR TO FIRST INFUSION FOR WOMEN OF CHILDBEARING ABILITY   REMS DRUG    Recommended Vitals/Observation:  Vitals: Obtain vitals prior to injection.  Observation: Patient may leave immediately following injection.        Weight Based Drug Calculations:    WEIGHT BASED DRUGS: NOT APPLICABLE / FLAT DOSE       Post Treatment: Patient tolerated treatment without issue and was discharged in no apparent distress.      Note Authored / Patient Cared for By: Adwoa Melgoza LPN

## 2025-06-23 NOTE — PATIENT INSTRUCTIONS
Today :We administered denosumab. Prolia 60mg subcutaneous injection right upper arm  Blood Calcium within 28 days of next Prolia appointment    For:   1. Age-related osteoporosis without current pathological fracture       Your next appointment is due in:  6 months        Please read the  Medication Guide that was given to you and reviewed during todays visit.     (Tell all doctors including dentists that you are taking this medication)     Go to the emergency room or call 911 if:  -You have signs of allergic reaction:   -Rash, hives, itching.   -Swollen, blistered, peeling skin.   -Swelling of face, lips, mouth, tongue or throat.   -Tightness of chest, trouble breathing, swallowing or talking     Call your doctor:  - If  injection site gets red, warm, swollen, itchy or leaks fluid or pus.     (Leave band aid on your injection site for at least 2 hours and keep area clean and dry)  - If you get sick or have symptoms of infection or are not feeling well for any reason.    (Wash your hands often, stay away from people who are sick)  - If you have side effects from your medication that do not go away or are bothersome.     (Refer to the teaching your nurse gave you for side effects to call your doctor about)    - Common side effects may include:  stuffy nose, headache, feeling tired, muscle aches, upset stomach  - Before receiving any vaccines     - Call [facility] at   If:  - You get sick, are on antibiotics, have had a recent vaccine, have surgery or dental work and your doctor wants your visit rescheduled.  - You need to cancel and reschedule your visit for any reason. Call at least 2 days before your visit if you need to cancel.   - Your insurance changes before your next visit.    (We will need to get approval from your new insurance. This can take up to two weeks.)     The [facility] is opened Monday thru Friday. We are closed on weekends and holidays.   Voice mail will take  your call if the center is closed. If you leave a message please allow 24 hours for a call back during weekdays. If you leave a message on a weekend/holiday, we will call you back the next business day.    A pharmacist is available Monday - Friday from 8:30AM to 3:30PM to help answer any questions you may have about your prescriptions(s). Please call pharmacy at:    [facility]: [phone]  [facility]: [phone]  [facility]: [phone]

## 2025-06-24 ENCOUNTER — APPOINTMENT (OUTPATIENT)
Dept: RADIOLOGY | Facility: CLINIC | Age: 89
End: 2025-06-24
Payer: MEDICARE

## 2025-06-26 ENCOUNTER — HOSPITAL ENCOUNTER (OUTPATIENT)
Dept: RADIOLOGY | Facility: CLINIC | Age: 89
End: 2025-06-26
Payer: MEDICARE

## 2025-07-10 ENCOUNTER — APPOINTMENT (OUTPATIENT)
Dept: RADIOLOGY | Facility: CLINIC | Age: 89
End: 2025-07-10
Payer: MEDICARE

## 2025-11-06 ENCOUNTER — APPOINTMENT (OUTPATIENT)
Dept: PRIMARY CARE | Facility: CLINIC | Age: 89
End: 2025-11-06
Payer: MEDICARE

## 2025-12-22 ENCOUNTER — APPOINTMENT (OUTPATIENT)
Dept: INFUSION THERAPY | Facility: CLINIC | Age: 89
End: 2025-12-22
Payer: MEDICARE

## 2026-06-11 ENCOUNTER — APPOINTMENT (OUTPATIENT)
Dept: RADIOLOGY | Facility: CLINIC | Age: 89
End: 2026-06-11
Payer: MEDICARE

## 2026-06-18 ENCOUNTER — APPOINTMENT (OUTPATIENT)
Dept: PRIMARY CARE | Facility: CLINIC | Age: 89
End: 2026-06-18
Payer: MEDICARE